# Patient Record
Sex: MALE | Race: WHITE | NOT HISPANIC OR LATINO | Employment: OTHER | URBAN - NONMETROPOLITAN AREA
[De-identification: names, ages, dates, MRNs, and addresses within clinical notes are randomized per-mention and may not be internally consistent; named-entity substitution may affect disease eponyms.]

---

## 2017-06-09 ENCOUNTER — OFFICE VISIT (OUTPATIENT)
Dept: FAMILY MEDICINE CLINIC | Facility: CLINIC | Age: 42
End: 2017-06-09

## 2017-06-09 VITALS
SYSTOLIC BLOOD PRESSURE: 120 MMHG | WEIGHT: 196 LBS | BODY MASS INDEX: 28.06 KG/M2 | HEIGHT: 70 IN | DIASTOLIC BLOOD PRESSURE: 70 MMHG

## 2017-06-09 DIAGNOSIS — L29.9 ITCH OF SKIN: ICD-10-CM

## 2017-06-09 DIAGNOSIS — R21 RASH, SKIN: Primary | ICD-10-CM

## 2017-06-09 PROBLEM — Z72.0 TOBACCO USE: Chronic | Status: ACTIVE | Noted: 2017-06-09

## 2017-06-09 PROBLEM — J45.20 MILD INTERMITTENT ASTHMA: Chronic | Status: ACTIVE | Noted: 2017-06-09

## 2017-06-09 PROBLEM — J45.20 MILD INTERMITTENT ASTHMA: Status: ACTIVE | Noted: 2017-06-09

## 2017-06-09 PROBLEM — F99 PSYCHIATRIC DISORDER: Chronic | Status: ACTIVE | Noted: 2017-06-09

## 2017-06-09 PROBLEM — Z72.0 TOBACCO USE: Status: ACTIVE | Noted: 2017-06-09

## 2017-06-09 PROBLEM — F99 PSYCHIATRIC DISORDER: Status: ACTIVE | Noted: 2017-06-09

## 2017-06-09 PROCEDURE — 99213 OFFICE O/P EST LOW 20 MIN: CPT | Performed by: FAMILY MEDICINE

## 2017-06-09 RX ORDER — VILAZODONE HYDROCHLORIDE 40 MG/1
40 TABLET ORAL DAILY
COMMUNITY

## 2017-06-09 RX ORDER — PREDNISONE 20 MG/1
TABLET ORAL
Qty: 10 TABLET | Refills: 1 | Status: SHIPPED | OUTPATIENT
Start: 2017-06-09 | End: 2017-09-05

## 2017-06-09 RX ORDER — CLOBETASOL PROPIONATE 0.46 MG/ML
SOLUTION TOPICAL 2 TIMES DAILY
Qty: 2 EACH | Refills: 1 | Status: SHIPPED | OUTPATIENT
Start: 2017-06-09 | End: 2017-12-18

## 2017-06-09 NOTE — PROGRESS NOTES
Subjective   Ankush Miller is a 42 y.o. male.     History of Present Illness resting evaluation rash.  Says his had about a month.  2 other people in the home and that it.  Went to local pharmacy store got permethrin treated all 3.  Says rash is better but still itches.  Is also wash all bed linens and underclothing.  History noted.    The following portions of the patient's history were reviewed and updated as appropriate: allergies, current medications, past family history, past medical history, past social history, past surgical history and problem list.    Review of Systems   Constitutional: Negative for activity change, appetite change, fatigue and unexpected weight change.   HENT: Negative for trouble swallowing and voice change.    Eyes: Negative for redness and visual disturbance.   Respiratory: Negative for cough and wheezing.    Cardiovascular: Negative for chest pain and palpitations.   Gastrointestinal: Negative for abdominal pain, constipation, diarrhea, nausea and vomiting.   Genitourinary: Negative for urgency.   Musculoskeletal: Negative for joint swelling.   Skin: Positive for rash.   Neurological: Negative for syncope and headaches.   Hematological: Negative for adenopathy.   Psychiatric/Behavioral: Negative for sleep disturbance.       Objective   Physical Exam   HENT:   Head: Normocephalic.   Neck: Normal range of motion.   Cardiovascular: Normal rate.    Pulmonary/Chest: Effort normal.   Abdominal: Soft.   Skin:   Shows no evidence of scabies upper and lower extremities.  Mild excoriations.  Most of them are dry.  Mainly just post scabies pruritus.   Psychiatric: He has a normal mood and affect. His speech is normal.       Assessment/Plan   Ankush was seen today for establish care.    Diagnoses and all orders for this visit:    Rash, skin  -     predniSONE (DELTASONE) 20 MG tablet; 2qdx5  -     clobetasol (TEMOVATE) 0.05 % external solution; Apply  topically 2 (Two) Times a Day. To skin x  7-10d    Itch of skin  -     predniSONE (DELTASONE) 20 MG tablet; 2qdx5  -     clobetasol (TEMOVATE) 0.05 % external solution; Apply  topically 2 (Two) Times a Day. To skin x 7-10d       Skincare cool water mild soap etc.  Counseled on pathophysiology of the pruritus.  Antihistamine of choice by mouth medications ordered.  Counseled on timeframe.  Otherwise defer back to the [facility] for his care.

## 2017-06-12 DIAGNOSIS — R21 RASH: Primary | ICD-10-CM

## 2017-06-12 RX ORDER — PERMETHRIN 50 MG/G
CREAM TOPICAL ONCE
Qty: 2 EACH | Refills: 3 | Status: SHIPPED | OUTPATIENT
Start: 2017-06-12 | End: 2017-06-13 | Stop reason: SDUPTHER

## 2017-06-13 ENCOUNTER — TELEPHONE (OUTPATIENT)
Dept: FAMILY MEDICINE CLINIC | Facility: CLINIC | Age: 42
End: 2017-06-13

## 2017-06-13 RX ORDER — PERMETHRIN 50 MG/G
CREAM TOPICAL ONCE
Qty: 2 EACH | Refills: 3 | Status: SHIPPED | OUTPATIENT
Start: 2017-06-13 | End: 2017-06-13

## 2017-06-13 NOTE — TELEPHONE ENCOUNTER
HAYDEN GONZALEZ WAS SEEN YESTERDAY..HE CALLED BACK BECAUSE THE PRESPS WERE SENT TO [facility] AND WILL COST HIM OVER $300 THERE.[facility] IS [facility]# [phone]   AND HE WILL NEED 3 TUBES OF THE PEMETHRIN  AND PLEASE SEND THE CLOBATOSOL AND PREDNISONE...ANY QUESTIONS PLEASE CALL HIM BACK ASAP-  HE IS NEEDING THIS DONE

## 2017-09-05 ENCOUNTER — OFFICE VISIT (OUTPATIENT)
Dept: FAMILY MEDICINE CLINIC | Facility: CLINIC | Age: 42
End: 2017-09-05

## 2017-09-05 VITALS
HEIGHT: 70 IN | SYSTOLIC BLOOD PRESSURE: 160 MMHG | WEIGHT: 199.3 LBS | DIASTOLIC BLOOD PRESSURE: 100 MMHG | BODY MASS INDEX: 28.53 KG/M2

## 2017-09-05 DIAGNOSIS — Z23 NEED FOR VACCINATION: Primary | ICD-10-CM

## 2017-09-05 DIAGNOSIS — R20.2 PARESTHESIA OF RIGHT ARM: ICD-10-CM

## 2017-09-05 DIAGNOSIS — M54.2 NECK PAIN: ICD-10-CM

## 2017-09-05 PROCEDURE — 99214 OFFICE O/P EST MOD 30 MIN: CPT | Performed by: FAMILY MEDICINE

## 2017-09-05 RX ORDER — CYCLOBENZAPRINE HCL 5 MG
5 TABLET ORAL 3 TIMES DAILY PRN
Qty: 60 TABLET | Refills: 1 | Status: SHIPPED | OUTPATIENT
Start: 2017-09-05 | End: 2017-10-30 | Stop reason: SDUPTHER

## 2017-09-05 NOTE — PROGRESS NOTES
Subjective   Ankush Miller is a 42 y.o. male.     History of Present Illness requesting reevaluation 2 month history of posterior neck pain burning stinging neuropathic pain posterior arm down into the fourth and fifth digits.  No history of direct trauma however.   had old injury in the past.  [facility] chiropractory as well as anti-inflammatories to no avail.  [facility]        The following portions of the patient's history were reviewed and updated as appropriate: allergies, current medications, past family history, past medical history, past social history, past surgical history and problem list.    Review of Systems   Constitutional: Negative for activity change, appetite change, fatigue and unexpected weight change.   HENT: Negative for trouble swallowing and voice change.    Eyes: Negative for redness and visual disturbance.   Respiratory: Negative for cough and wheezing.    Cardiovascular: Negative for chest pain and palpitations.   Gastrointestinal: Negative for abdominal pain, constipation, diarrhea, nausea and vomiting.   Genitourinary: Negative for urgency.   Musculoskeletal: Positive for neck pain and neck stiffness. Negative for joint swelling.   Neurological: Positive for numbness. Negative for syncope and headaches.   Hematological: Negative for adenopathy.   Psychiatric/Behavioral: Negative for sleep disturbance.       Objective   Physical Exam   Constitutional: He is oriented to person, place, and time. He appears well-developed.   HENT:   Head: Normocephalic.   Eyes: Pupils are equal, round, and reactive to light.   Neck: Normal range of motion. Neck supple. No thyromegaly present.   Cardiovascular: Normal rate.    Pulmonary/Chest: Effort normal.   Abdominal: Soft.   Musculoskeletal:   Normal range of motion shoulder pain to range of motion neck.    Palpable tenderness left strap muscle   Neurological: He is alert and oriented to person, place, and time. He has normal  reflexes.   Negative pronator drift   Psychiatric: He has a normal mood and affect. His speech is normal.       Assessment/Plan   Problems Addressed this Visit     None      Visit Diagnoses     Need for vaccination    -  Primary    Neck pain        Relevant Medications    cyclobenzaprine (FLEXERIL) 5 MG tablet    Other Relevant Orders    Ambulatory Referral to Physical Therapy Evaluate and treat    MRI Cervical Spine Without Contrast    Paresthesia of right arm        Relevant Medications    cyclobenzaprine (FLEXERIL) 5 MG tablet    Other Relevant Orders    Ambulatory Referral to Physical Therapy Evaluate and treat    MRI Cervical Spine Without Contrast         need for further study to rule out an anatomic lesion that needs intervention special given localized symptoms.  We'll start physical therapy medicine at request follow-up based on results.  Four-week recheck

## 2017-09-22 DIAGNOSIS — F99 PSYCHIATRIC DISORDER: Primary | Chronic | ICD-10-CM

## 2017-09-22 RX ORDER — LORAZEPAM 0.5 MG/1
TABLET ORAL
Qty: 2 TABLET | Refills: 0 | Status: SHIPPED | OUTPATIENT
Start: 2017-09-22 | End: 2017-12-18

## 2017-09-25 ENCOUNTER — TELEPHONE (OUTPATIENT)
Dept: FAMILY MEDICINE CLINIC | Facility: CLINIC | Age: 42
End: 2017-09-25

## 2017-09-29 ENCOUNTER — TELEPHONE (OUTPATIENT)
Dept: FAMILY MEDICINE CLINIC | Facility: CLINIC | Age: 42
End: 2017-09-29

## 2017-10-02 ENCOUNTER — HOSPITAL ENCOUNTER (OUTPATIENT)
Dept: MRI IMAGING | Facility: HOSPITAL | Age: 42
Discharge: HOME | End: 2017-10-02
Admitting: FAMILY MEDICINE

## 2017-10-02 ENCOUNTER — APPOINTMENT (OUTPATIENT)
Dept: MRI IMAGING | Facility: HOSPITAL | Age: 42
End: 2017-10-02

## 2017-10-02 DIAGNOSIS — M54.2 NECK PAIN: ICD-10-CM

## 2017-10-02 DIAGNOSIS — R20.2 PARESTHESIA OF RIGHT ARM: ICD-10-CM

## 2017-10-02 PROCEDURE — 72141 MRI NECK SPINE W/O DYE: CPT

## 2017-10-03 ENCOUNTER — APPOINTMENT (OUTPATIENT)
Dept: PHYSICAL THERAPY | Facility: HOSPITAL | Age: 42
End: 2017-10-03

## 2017-10-30 ENCOUNTER — TELEPHONE (OUTPATIENT)
Dept: ENDOCRINOLOGY | Facility: CLINIC | Age: 42
End: 2017-10-30

## 2017-10-30 DIAGNOSIS — M50.90 CERVICAL DISC DISEASE: Primary | ICD-10-CM

## 2017-10-30 DIAGNOSIS — M54.2 NECK PAIN: ICD-10-CM

## 2017-10-30 DIAGNOSIS — R20.2 PARESTHESIA OF RIGHT ARM: ICD-10-CM

## 2017-10-30 RX ORDER — CYCLOBENZAPRINE HCL 5 MG
5 TABLET ORAL 3 TIMES DAILY PRN
Qty: 60 TABLET | Refills: 2 | Status: SHIPPED | OUTPATIENT
Start: 2017-10-30 | End: 2018-01-25 | Stop reason: SDUPTHER

## 2017-11-06 ENCOUNTER — HOSPITAL ENCOUNTER (OUTPATIENT)
Dept: PHYSICAL THERAPY | Facility: HOSPITAL | Age: 42
Setting detail: THERAPIES SERIES
Discharge: HOME | End: 2017-11-06

## 2017-11-29 ENCOUNTER — APPOINTMENT (OUTPATIENT)
Dept: PHYSICAL THERAPY | Facility: HOSPITAL | Age: 42
End: 2017-11-29

## 2017-12-13 ENCOUNTER — TELEPHONE (OUTPATIENT)
Dept: FAMILY MEDICINE CLINIC | Facility: CLINIC | Age: 42
End: 2017-12-13

## 2017-12-13 DIAGNOSIS — M54.2 NECK PAIN: Primary | ICD-10-CM

## 2017-12-18 ENCOUNTER — HOSPITAL ENCOUNTER (OUTPATIENT)
Dept: PHYSICAL THERAPY | Facility: HOSPITAL | Age: 42
Setting detail: THERAPIES SERIES
Discharge: HOME | End: 2017-12-18

## 2017-12-18 ENCOUNTER — OFFICE VISIT (OUTPATIENT)
Dept: FAMILY MEDICINE CLINIC | Facility: CLINIC | Age: 42
End: 2017-12-18

## 2017-12-18 VITALS
WEIGHT: 199.4 LBS | SYSTOLIC BLOOD PRESSURE: 132 MMHG | DIASTOLIC BLOOD PRESSURE: 86 MMHG | HEIGHT: 70 IN | BODY MASS INDEX: 28.55 KG/M2

## 2017-12-18 DIAGNOSIS — M54.2 NECK PAIN, CHRONIC: Primary | ICD-10-CM

## 2017-12-18 DIAGNOSIS — G89.29 NECK PAIN, CHRONIC: Primary | ICD-10-CM

## 2017-12-18 DIAGNOSIS — Z86.39 HISTORY OF HYPOTESTOSTERONEMIA: ICD-10-CM

## 2017-12-18 DIAGNOSIS — M54.2 NECK PAIN: Primary | ICD-10-CM

## 2017-12-18 DIAGNOSIS — Z72.0 TOBACCO USE: ICD-10-CM

## 2017-12-18 PROCEDURE — 97162 PT EVAL MOD COMPLEX 30 MIN: CPT | Performed by: PHYSICAL THERAPIST

## 2017-12-18 PROCEDURE — 99213 OFFICE O/P EST LOW 20 MIN: CPT | Performed by: FAMILY MEDICINE

## 2017-12-18 PROCEDURE — 97140 MANUAL THERAPY 1/> REGIONS: CPT | Performed by: PHYSICAL THERAPIST

## 2017-12-18 NOTE — THERAPY EVALUATION
Outpatient Physical Therapy Ortho Initial Evaluation  [facility]     Patient Name: Ankush Miller  : 1975  MRN: [id number]  Today's Date: 2017      Visit Date: 2017  Attendance:  (12 approved)  Subjective Improvement: n/a  Next MD Appt: 17  Recert Date: 17    Therapy Diagnosis: Cervical dysfunction         Past Medical History:   Diagnosis Date   • Allergic rhinitis    • Anal pain     Anal pain - anal fissure      • Constipation    • Dyspnea    • Kidney stone    • Migraine    • Non-IgE mediated allergic asthma     IgE-mediated allergic asthma      • PTSD (post-traumatic stress disorder)    • Tobacco dependence syndrome         Past Surgical History:   Procedure Laterality Date   • ANAL FISSURECTOMY/FISTULECTOMY  2013    Anal fissurectomy with VY-angioplasty and lateral internal sphincterotomy.    • CHOLECYSTECTOMY     • ENDOSCOPY AND COLONOSCOPY  2013     1 polyp in sigmoid colon; removed by snare catuery polypectomy. Internal & external hemorrhoids found.    • INJECTION OF MEDICATION  2013    Depo Medrol (Methylprednisone) (7)         • INJECTION OF MEDICATION  2013    Phenergan (1)            Visit Dx:     ICD-10-CM ICD-9-CM   1. Neck pain M54.2 723.1             Patient History       17 0900          History    Chief Complaint Pain;Numbness   neck pain that radiates L UE  -SS      Type of Pain Neck pain  -SS      Date Current Problem(s) Began --   5 months  -SS      Brief Description of Current Complaint Patient presents c/o pain that starts in the lower cervical spine that radiates across the upper trap region and down the left upper extremity. He is experiencing intermittent numbness in the left RF & SF. He had some pain and numbness chronically that worsened after a fall approximately 5 months ago. Slipped in kitchen floor. Landed on his back, but he had his neck flexed to protect it. This jerked on his neck. He went to a chiropractor for 3  "visits which made him feel worse.  male with children.  -SS      Patient/Caregiver Goals Relieve pain;Improve strength  -SS      Current Tobacco Use smoker  -SS      Smoking Status 6 cigarettes/day  -SS      Patient's Rating of General Health Very good  -SS      Hand Dominance right-handed  -SS      Occupation/sports/leisure activities Disabled. Hobbies: build software  -      What clinical tests have you had for this problem? MRI  -SS      Results of Clinical Tests results in EMR  -SS      Pain     Pain Location Neck;Arm  -SS      Pain at Present 2  -SS      Pain at Best 2   over past 1 month  -SS      Pain at Worst 4   over past 1 month  -SS      Pain Frequency Constant/continuous  -SS      Pain Description Burning;Radiating   occasional numbness & tingling  -SS      What Performance Factors Make the Current Problem(s) WORSE? keeping arm out in front of him, sleeping in a funny angle, \"sometimes trying to go to bed at all\"  -SS      What Performance Factors Make the Current Problem(s) BETTER? putting hand behind back  -SS      Is your sleep disturbed? Yes  -SS      Is medication used to assist with sleep? No  -SS      Difficulties at work? n/a  -SS      Difficulties with ADL's? none  -SS      Difficulties with recreational activities? occasional sitting at desk  -SS      Fall Risk Assessment    Any falls in the past year: Yes  -SS      Number of falls reported in the last 12 months 1  -SS      Factors that contributed to the fall: Slippery surface  -SS      Does patient have a fear of falling No  -SS      Daily Activities    Primary Language English  -      Safety    Are you being hurt, hit, or frightened by anyone at home or in your life? No  -SS      Are you being neglected by a caregiver No  -SS        User Key  (r) = Recorded By, (t) = Taken By, (c) = Cosigned By    Initials Name Provider Type    SS Hermilo Johns, PT DPT Physical Therapist                PT Ortho       12/18/17 0900    " Subjective Comments    Subjective Comments see Therapy Patient History  -SS    Precautions and Contraindications    Precautions/Limitations no known precautions/limitations  -SS    Precautions none  -SS    Contraindications none  -SS    Subjective Pain    Able to rate subjective pain? yes  -SS    Pre-Treatment Pain Level 2  -SS    Post-Treatment Pain Level 2  -SS    Subjective Pain Comment patient declines modalities  -SS    Posture/Observations    Alignment Options Forward head;Cervical lordosis;Thoracic kyphosis;Rounded shoulders;Scapular elevation;Lumbar lordosis  -SS    Forward Head Mild  -SS    Cervical Lordosis Decreased  -SS    Thoracic Kyphosis Normal  -SS    Rounded Shoulders Left:  -SS    Scapular Elevation Left:  -SS    Lumbar lordosis Normal  -SS    DTR- Upper Quarter Clearing    Biceps (C5/6) Bilateral:;1- Minimal response  -SS    Brachioradialis (C6) Bilateral:;1- Minimal response  -SS    Triceps (C7) Bilateral:;1- Minimal response  -SS    Myotomal Screen- Upper Quarter Clearing    Shoulder flexion (C5) Right:;5 (Normal);Left:;4 (Good)   L cervical pain  -SS    Elbow flexion/wrist extension (C6) Bilateral:;5 (Normal)  -SS    Elbow extension/wrist flexion (C7) Bilateral:;5 (Normal)  -SS    Finger flexion/ (C8) Bilateral:;5 (Normal)  -SS    Finger abduction (T1) Bilateral:;5 (Normal)  -SS     --   R 110#, L 105#  -SS    Cervical Palpation    Cervical Palpation- Location? Occiput;Suboccipital;SCM;Cervical facets;Spinous process;Levator scapula;Upper traps  -SS    Occiput --   non-tender  -SS    Suboccipital --   non-tender  -SS    SCM --   non-tender  -SS    Cervical Facets Left:;Tender   lower cervical spine R SB & rotated  -SS    Spinous Process Left:;Tender   lower cervical spine R SB & rotated  -SS    Levator Scapula Left:;Tender  -SS    Upper Traps Left:;Tender;Guarded/taut;Trigger point  -SS    Upper Level Neural Tension Tests    Median Neural Tension Test Left:;Negative  -SS    Neck     Flexion AROM Deficit 42; pulling posterior-L lower cervical spine/CT junction  -SS    Extension AROM Deficit 40  -SS    Lt Lat Flexion AROM Deficit 30  -SS    Rt Lateral Flexion AROM Deficit 33; pulling posterior-L lower cervical spine  -SS    Lt Rotation AROM Deficit 60; pulling posterior-L lower cervical spine  -SS    Rt Rotation AROM Deficit 71; pulling posterior-L lower cervical spine  -SS    General UE Assessment    ROM Detail B shoulder AROM grossly WNLs. Patient noted pain in L SS/UT region with IR  -SS      User Key  (r) = Recorded By, (t) = Taken By, (c) = Cosigned By    Initials Name Provider Type     Hermilo Johns, PT DPT Physical Therapist                      Therapy Education  Education Details: levator scapula stretch, UT stretch  Given: HEP  Program: New  How Provided: Verbal, Demonstration  Provided to: Patient  Level of Understanding: Verbalized, Demonstrated           PT OP Goals       12/18/17 0900       PT Short Term Goals    STG Date to Achieve 01/08/18  -SS     STG 1 Note a >/= 50% improvement  -     STG 2 NDI score </= 10  -     STG 3 L shoulder flexion MMT 5/5  -SS     Long Term Goals    LTG Date to Achieve 01/29/18  -SS     LTG 1 Independent with HEP  -     LTG 2 CROM WNLs without pain  -     LTG 3 Minimal to no pain with activity  -     Time Calculation    PT Goal Re-Cert Due Date 01/08/18  -       User Key  (r) = Recorded By, (t) = Taken By, (c) = Cosigned By    Initials Name Provider Type     Hermilo Johns, PT DPT Physical Therapist                PT Assessment/Plan       12/18/17 1000       PT Assessment    Functional Limitations Limitations in functional capacity and performance  -     Impairments Range of motion;Pain;Joint mobility  -     Assessment Comments Patient's alignment was corrected with ME this date. Decreased trigger point L UT once alignment was corrected.  -     Rehab Potential Good  -SS     Patient/caregiver participated in  establishment of treatment plan and goals Yes  -SS     Patient would benefit from skilled therapy intervention Yes  -SS     PT Plan    PT Frequency 2x/week  -SS     Predicted Duration of Therapy Intervention (days/wks) 4-6 weeks  -SS     Planned CPT's? PT EVAL MOD COMPLEXITY: 12098;PT THER PROC EA 15 MIN: 15843;PT THER ACT EA 15 MIN: 91747;PT MANUAL THERAPY EA 15 MIN: 00947;PT ELECTRICAL STIM UNATTEND: ;PT HOT OR COLD PACK TREAT MCARE;PT ULTRASOUND EA 15 MIN: 73030;PT THER SUPP EA 15 MIN  -SS     PT Plan Comments Cervical ROM, stretching, strengthening; US/estim combo to lower cervicals and UT; manual therapy/MFR/joint mobilization; IFC estim with heat/ice as needed for pain. Work to transition to self-management as quickly as able.  -SS       User Key  (r) = Recorded By, (t) = Taken By, (c) = Cosigned By    Initials Name Provider Type    KASSIDY Johns, WALKER DPT Physical Therapist                  Exercises       12/18/17 0900          Subjective Comments    Subjective Comments see Therapy Patient History  -SS      Subjective Pain    Able to rate subjective pain? yes  -SS      Pre-Treatment Pain Level 2  -SS      Post-Treatment Pain Level 2  -SS      Subjective Pain Comment patient declines modalities  -SS      Exercise 1    Exercise Name 1 UT stretch - L  -SS      Cueing 1 Verbal;Demo  -SS      Sets 1 3  -SS      Time (Seconds) 1 30 sec hold  -SS      Exercise 2    Exercise Name 2 Lev scap stretch - L  -SS      Cueing 2 Verbal;Demo  -SS      Sets 2 3  -SS      Time (Seconds) 2 30 sec hold  -SS        User Key  (r) = Recorded By, (t) = Taken By, (c) = Cosigned By    Initials Name Provider Type     Hermilo Johns, PT DPT Physical Therapist           Manual Rx (last 36 hours)      Manual Treatments       12/18/17 0900          Manual Rx 1    Manual Rx 1 Location cervical spine  -SS      Manual Rx 1 Type ME  -SS      Manual Rx 2    Manual Rx 2 Location Thoracic spine  -SS      Manual Rx 2 Type  joint mobilization  -SS      Manual Rx 2 Grade 4  -SS        User Key  (r) = Recorded By, (t) = Taken By, (c) = Cosigned By    Initials Name Provider Type    SS Hermilo Johns, PT DPT Physical Therapist                      Outcome Measure Options: Neck Disability Index (NDI)  Neck Disability Index  Section 1 - Pain Intensity: The pain is very mild at the moment.  Section 2 - Personal Care: I can look after myself normally without causing extra pain.  Section 3 - Lifting: Pain prevents me from lifting heavy weights off the floor but I can manage if items are conveniently positioned, ie. on a table.  Section 4 - Work: I can do most of my usual work, but no more  Section 6 - Concentration: I have a fair degree of difficulty concentrating.  Section 7 - Sleeping: My sleep is moderately disturbed for up to 2-3 hours.  Section 8 - Driving: I can't drive as long as I want because of moderate neck pain.  Section 9 - Reading: I can read as much as I want with moderate neck pain.  Neck Disability Index Score: 15      Time Calculation:   Start Time: 0917  Stop Time: 0951  Time Calculation (min): 34 min  Total Timed Code Minutes- PT: 10 minute(s)     Therapy Charges for Today     Code Description Service Date Service Provider Modifiers Qty    75711839979 HC PT MANUAL THERAPY EA 15 MIN 12/18/2017 Hermilo Johns, PT DPT GP 1    12125412398 HC PT EVAL MOD COMPLEXITY 2 12/18/2017 Hermilo Johns, PT DPT GP 1                   Hermilo Johns, PT, DPT, CHT  12/18/2017

## 2017-12-18 NOTE — PROGRESS NOTES
Subjective   Ankush Miller is a 42 y.o. male.     History of Present Illness  patient states was starting primary care [place] [facility] [place] [facility].  Apparently gets medication for chronic pain syndrome and hypotension testosteronemia.  Schistocytes injections or 2 weeks medicines.    Gabapentin.  Have counseled the need to [facility] in regards to dosing drug levels etc. before proceed any further.  He did start his therapy for neck pain today.  History noted.    The following portions of the patient's history were reviewed and updated as appropriate: allergies, current medications, past family history, past medical history, past social history, past surgical history and problem list.    Review of Systems   Constitutional: Negative for activity change, appetite change, fatigue and unexpected weight change.   HENT: Negative for trouble swallowing and voice change.    Eyes: Negative for redness and visual disturbance.   Respiratory: Negative for cough and wheezing.    Cardiovascular: Negative for chest pain and palpitations.   Gastrointestinal: Negative for abdominal pain, constipation, diarrhea, nausea and vomiting.   Genitourinary: Negative for urgency.   Musculoskeletal: Positive for neck pain. Negative for joint swelling.   Neurological: Negative for syncope and headaches.   Hematological: Negative for adenopathy.   Psychiatric/Behavioral: Negative for sleep disturbance.       Objective   Physical Exam   Constitutional: He appears well-developed.   HENT:   Head: Normocephalic.   Eyes: Pupils are equal, round, and reactive to light.   Neck: Normal range of motion.   Cardiovascular: Normal rate.    Pulmonary/Chest: Effort normal.   Abdominal: Soft.   Psychiatric: He has a normal mood and affect. His behavior is normal. Thought content normal.       Assessment/Plan   Ankush was seen today for follow-up.    Diagnoses and all orders for this visit:    Neck pain, chronic    Tobacco  use    History of hypotestosteronemia       On the above will follow-up based on results and records

## 2017-12-19 ENCOUNTER — HOSPITAL ENCOUNTER (OUTPATIENT)
Dept: PHYSICAL THERAPY | Facility: HOSPITAL | Age: 42
Setting detail: THERAPIES SERIES
Discharge: HOME | End: 2017-12-19

## 2017-12-19 DIAGNOSIS — M54.2 NECK PAIN: Primary | ICD-10-CM

## 2017-12-19 PROCEDURE — 97140 MANUAL THERAPY 1/> REGIONS: CPT

## 2017-12-19 PROCEDURE — 97035 APP MDLTY 1+ULTRASOUND EA 15: CPT

## 2017-12-19 PROCEDURE — 97110 THERAPEUTIC EXERCISES: CPT

## 2017-12-19 NOTE — THERAPY TREATMENT NOTE
Outpatient Physical Therapy Ortho Treatment Note  [facility]     Patient Name: Ankush Miller  : 1975  MRN: [id number]  Today's Date: 2017      Visit Date: 2017     Subjective Improvement 0  Visits 2/2  Visits 12 approved  RTMD PRN  Recert Date 2017    Cervical Dysfunction      Visit Dx:    ICD-10-CM ICD-9-CM   1. Neck pain M54.2 723.1       Patient Active Problem List   Diagnosis   • Tobacco use   • Mild intermittent asthma   • Psychiatric disorder   • History of hypotestosteronemia        Past Medical History:   Diagnosis Date   • Allergic rhinitis    • Anal pain     Anal pain - anal fissure      • Constipation    • Dyspnea    • Kidney stone    • Migraine    • Non-IgE mediated allergic asthma     IgE-mediated allergic asthma      • PTSD (post-traumatic stress disorder)    • Tobacco dependence syndrome         Past Surgical History:   Procedure Laterality Date   • ANAL FISSURECTOMY/FISTULECTOMY  2013    Anal fissurectomy with VY-angioplasty and lateral internal sphincterotomy.    • CHOLECYSTECTOMY     • ENDOSCOPY AND COLONOSCOPY  2013     1 polyp in sigmoid colon; removed by snare catuery polypectomy. Internal & external hemorrhoids found.    • INJECTION OF MEDICATION  2013    Depo Medrol (Methylprednisone) (7)         • INJECTION OF MEDICATION  2013    Phenergan (1)                  PT Ortho       17 0900    Subjective Comments    Subjective Comments see Therapy Patient History  -SS    Precautions and Contraindications    Precautions/Limitations no known precautions/limitations  -SS    Precautions none  -SS    Contraindications none  -SS    Subjective Pain    Able to rate subjective pain? yes  -SS    Pre-Treatment Pain Level 2  -SS    Post-Treatment Pain Level 2  -SS    Subjective Pain Comment patient declines modalities  -SS    Posture/Observations    Alignment Options Forward head;Cervical lordosis;Thoracic kyphosis;Rounded shoulders;Scapular  elevation;Lumbar lordosis  -SS    Forward Head Mild  -SS    Cervical Lordosis Decreased  -SS    Thoracic Kyphosis Normal  -SS    Rounded Shoulders Left:  -SS    Scapular Elevation Left:  -SS    Lumbar lordosis Normal  -SS    DTR- Upper Quarter Clearing    Biceps (C5/6) Bilateral:;1- Minimal response  -SS    Brachioradialis (C6) Bilateral:;1- Minimal response  -SS    Triceps (C7) Bilateral:;1- Minimal response  -SS    Myotomal Screen- Upper Quarter Clearing    Shoulder flexion (C5) Right:;5 (Normal);Left:;4 (Good)   L cervical pain  -SS    Elbow flexion/wrist extension (C6) Bilateral:;5 (Normal)  -SS    Elbow extension/wrist flexion (C7) Bilateral:;5 (Normal)  -SS    Finger flexion/ (C8) Bilateral:;5 (Normal)  -SS    Finger abduction (T1) Bilateral:;5 (Normal)  -SS     --   R 110#, L 105#  -SS    Cervical Palpation    Cervical Palpation- Location? Occiput;Suboccipital;SCM;Cervical facets;Spinous process;Levator scapula;Upper traps  -SS    Occiput --   non-tender  -SS    Suboccipital --   non-tender  -SS    SCM --   non-tender  -SS    Cervical Facets Left:;Tender   lower cervical spine R SB & rotated  -SS    Spinous Process Left:;Tender   lower cervical spine R SB & rotated  -SS    Levator Scapula Left:;Tender  -SS    Upper Traps Left:;Tender;Guarded/taut;Trigger point  -SS    Upper Level Neural Tension Tests    Median Neural Tension Test Left:;Negative  -SS    Neck    Flexion AROM Deficit 42; pulling posterior-L lower cervical spine/CT junction  -SS    Extension AROM Deficit 40  -SS    Lt Lat Flexion AROM Deficit 30  -SS    Rt Lateral Flexion AROM Deficit 33; pulling posterior-L lower cervical spine  -SS    Lt Rotation AROM Deficit 60; pulling posterior-L lower cervical spine  -SS    Rt Rotation AROM Deficit 71; pulling posterior-L lower cervical spine  -SS    General UE Assessment    ROM Detail B shoulder AROM grossly WNLs. Patient noted pain in L SS/UT region with IR  -SS      User Key  (r) = Recorded By,  (t) = Taken By, (c) = Cosigned By    Initials Name Provider Type     Hermilo Johns, PT DPT Physical Therapist                            PT Assessment/Plan       12/19/17 2633       PT Assessment    Assessment Comments ME corrected rotation.  -CP     PT Plan    PT Frequency 2x/week  -CP     Predicted Duration of Therapy Intervention (days/wks) 4 weeks  -CP     PT Plan Comments cont with POC. Monitor cervical rotationn  -CP       User Key  (r) = Recorded By, (t) = Taken By, (c) = Cosigned By    Initials Name Provider Type    JEFFREY Moe PTA Physical Therapy Assistant                Modalities       12/19/17 1100          Subjective Pain    Post-Treatment Pain Level 1  -CP      Ultrasound 97256    Location left UT and cervical area  -CP      Rx Minutes 8  -CP      Combo RX Minutes 79629 8  -CP      Duty Cycle 100  -CP      Frequency 1.0 MHz  -CP      Intensity - Wts/cm 1.5  -CP        User Key  (r) = Recorded By, (t) = Taken By, (c) = Cosigned By    Initials Name Provider Type    JEFFREY Moe PTA Physical Therapy Assistant                Exercises       12/19/17 1100          Subjective Comments    Subjective Comments Patient states that he felt better aft er the last PT visits  -CP      Subjective Pain    Able to rate subjective pain? yes  -CP      Pre-Treatment Pain Level 3  -CP      Post-Treatment Pain Level 1  -CP      Aquatics    Aquatics performed? No  -CP      Exercise 1    Exercise Name 1 Bilateral UT Stretch  -CP      Sets 1 3  -CP      Time (Seconds) 1 30  -CP      Exercise 2    Exercise Name 2 Bilateral LT stretch  -CP      Sets 2 3  -CP      Time (Seconds) 2 30  -CP      Exercise 3    Exercise Name 3 AROM cervical rotation  -CP      Sets 3 2  -CP      Reps 3 10  -CP      Exercise 4    Exercise Name 4 review HEP  -CP        User Key  (r) = Recorded By, (t) = Taken By, (c) = Cosigned By    Initials Name Provider Type    JEFFREY Moe PTA Physical Therapy Assistant                         Manual Rx (last 36 hours)      Manual Treatments       12/19/17 1300 12/18/17 0900       Manual Rx 1    Manual Rx 1 Location cervical spine  -CP cervical spine  -SS     Manual Rx 1 Type distraction  -CP ME  -SS     Manual Rx 1 Duration 5  -CP      Manual Rx 2    Manual Rx 2 Location Cervical spine  -CP Thoracic spine  -SS     Manual Rx 2 Type ME to correct left rotation  -CP joint mobilization  -SS     Manual Rx 2 Grade  4  -SS     Manual Rx 2 Duration 3  -CP        User Key  (r) = Recorded By, (t) = Taken By, (c) = Cosigned By    Initials Name Provider Type    SS Hermilo Johns, PT DPT Physical Therapist    CP Wen Moe, PTA Physical Therapy Assistant                PT OP Goals       12/19/17 1300       PT Short Term Goals    STG Date to Achieve 01/08/18  -CP     STG 1 Note a >/= 50% improvement  -CP     STG 1 Progress Not Met  -CP     STG 2 NDI score </= 10  -CP     STG 2 Progress Not Met  -CP     STG 3 L shoulder flexion MMT 5/5  -CP     STG 3 Progress Not Met  -CP     Long Term Goals    LTG Date to Achieve 01/29/18  -CP     LTG 1 Independent with HEP  -CP     LTG 1 Progress Progressing  -CP     LTG 2 CROM WNLs without pain  -CP     LTG 2 Progress Not Met  -CP     LTG 3 Minimal to no pain with activity  -CP     LTG 3 Progress Not Met  -CP     Time Calculation    PT Goal Re-Cert Due Date 01/08/18  -CP       User Key  (r) = Recorded By, (t) = Taken By, (c) = Cosigned By    Initials Name Provider Type    CP Wen Moe, CAR Physical Therapy Assistant          Therapy Education  Education Details: AROM cervical rotation  Given: HEP  Program: New  How Provided: Verbal, Demonstration  Provided to: Patient  Level of Understanding: Verbalized, Demonstrated              Time Calculation:   Start Time: 1105  Stop Time: 1145  Time Calculation (min): 40 min  Total Timed Code Minutes- PT: 40 minute(s)    Therapy Charges for Today     Code Description Service Date Service Provider  Modifiers Qty    06880348577 HC PT ULTRASOUND EA 15 MIN 12/19/2017 Wen Moe, PTA GP 1    21213196959 HC PT MANUAL THERAPY EA 15 MIN 12/19/2017 Wen Moe, PTA GP 1    15472942489 HC PT THER PROC EA 15 MIN 12/19/2017 Wen Moe, PTA GP 1                    Wen Moe, PTA  12/19/2017

## 2018-01-03 ENCOUNTER — TELEPHONE (OUTPATIENT)
Dept: FAMILY MEDICINE CLINIC | Facility: CLINIC | Age: 43
End: 2018-01-03

## 2018-01-09 ENCOUNTER — OFFICE VISIT (OUTPATIENT)
Dept: FAMILY MEDICINE CLINIC | Facility: CLINIC | Age: 43
End: 2018-01-09

## 2018-01-09 ENCOUNTER — HOSPITAL ENCOUNTER (OUTPATIENT)
Dept: PHYSICAL THERAPY | Facility: HOSPITAL | Age: 43
Setting detail: THERAPIES SERIES
Discharge: HOME | End: 2018-01-09

## 2018-01-09 ENCOUNTER — APPOINTMENT (OUTPATIENT)
Dept: LAB | Facility: HOSPITAL | Age: 43
End: 2018-01-09

## 2018-01-09 VITALS
DIASTOLIC BLOOD PRESSURE: 90 MMHG | WEIGHT: 197 LBS | HEIGHT: 70 IN | SYSTOLIC BLOOD PRESSURE: 128 MMHG | BODY MASS INDEX: 28.2 KG/M2

## 2018-01-09 DIAGNOSIS — G89.29 NECK PAIN, CHRONIC: ICD-10-CM

## 2018-01-09 DIAGNOSIS — M54.2 NECK PAIN, CHRONIC: ICD-10-CM

## 2018-01-09 DIAGNOSIS — Z86.39 HISTORY OF HYPOTESTOSTERONEMIA: Primary | Chronic | ICD-10-CM

## 2018-01-09 DIAGNOSIS — L85.3 DRY SKIN: ICD-10-CM

## 2018-01-09 DIAGNOSIS — Z72.0 TOBACCO USE: ICD-10-CM

## 2018-01-09 DIAGNOSIS — L60.3 NAIL DYSTROPHY: ICD-10-CM

## 2018-01-09 DIAGNOSIS — M54.2 NECK PAIN: Primary | ICD-10-CM

## 2018-01-09 DIAGNOSIS — R73.9 HYPERGLYCEMIA: ICD-10-CM

## 2018-01-09 LAB
ALBUMIN SERPL-MCNC: 4.9 G/DL (ref 3.4–4.8)
ALBUMIN UR-MCNC: 1.9 MG/L
ALBUMIN/GLOB SERPL: 1.4 G/DL (ref 1.1–1.8)
ALP SERPL-CCNC: 68 U/L (ref 38–126)
ALT SERPL W P-5'-P-CCNC: 80 U/L (ref 21–72)
ANION GAP SERPL CALCULATED.3IONS-SCNC: 13 MMOL/L (ref 5–15)
AST SERPL-CCNC: 53 U/L (ref 17–59)
BILIRUB SERPL-MCNC: 0.6 MG/DL (ref 0.2–1.3)
BUN BLD-MCNC: 16 MG/DL (ref 7–21)
BUN/CREAT SERPL: 14.5 (ref 7–25)
CALCIUM SPEC-SCNC: 10.3 MG/DL (ref 8.4–10.2)
CHLORIDE SERPL-SCNC: 99 MMOL/L (ref 95–110)
CHOLEST SERPL-MCNC: 244 MG/DL (ref 0–199)
CO2 SERPL-SCNC: 26 MMOL/L (ref 22–31)
CREAT BLD-MCNC: 1.1 MG/DL (ref 0.7–1.3)
DEPRECATED RDW RBC AUTO: 45.1 FL (ref 35.1–43.9)
ERYTHROCYTE [DISTWIDTH] IN BLOOD BY AUTOMATED COUNT: 13.4 % (ref 11.5–14.5)
GFR SERPL CREATININE-BSD FRML MDRD: 73 ML/MIN/1.73 (ref 63–147)
GLOBULIN UR ELPH-MCNC: 3.4 GM/DL (ref 2.3–3.5)
GLUCOSE BLD-MCNC: 168 MG/DL (ref 60–100)
HBA1C MFR BLD: 6.7 % (ref 4–5.6)
HCT VFR BLD AUTO: 44.8 % (ref 39–49)
HDLC SERPL-MCNC: 43 MG/DL (ref 60–200)
HGB BLD-MCNC: 15.1 G/DL (ref 13.7–17.3)
LDLC SERPL CALC-MCNC: ABNORMAL MG/DL (ref 0–129)
LDLC/HDLC SERPL: ABNORMAL {RATIO} (ref 0–3.55)
MAGNESIUM SERPL-MCNC: 2.3 MG/DL (ref 1.6–2.3)
MCH RBC QN AUTO: 31.2 PG (ref 26.5–34)
MCHC RBC AUTO-ENTMCNC: 33.7 G/DL (ref 31.5–36.3)
MCV RBC AUTO: 92.6 FL (ref 80–98)
PLATELET # BLD AUTO: 351 10*3/MM3 (ref 150–450)
PMV BLD AUTO: 10.4 FL (ref 8–12)
POTASSIUM BLD-SCNC: 4.5 MMOL/L (ref 3.5–5.1)
PROT SERPL-MCNC: 8.3 G/DL (ref 6.3–8.6)
RBC # BLD AUTO: 4.84 10*6/MM3 (ref 4.37–5.74)
SODIUM BLD-SCNC: 138 MMOL/L (ref 137–145)
T4 FREE SERPL-MCNC: 0.8 NG/DL (ref 0.78–2.19)
TRIGL SERPL-MCNC: 968 MG/DL (ref 20–199)
TSH SERPL DL<=0.05 MIU/L-ACNC: 0.51 MIU/ML (ref 0.46–4.68)
VIT B12 BLD-MCNC: 304 PG/ML (ref 239–931)
VLDLC SERPL-MCNC: ABNORMAL MG/DL
WBC NRBC COR # BLD: 9.17 10*3/MM3 (ref 3.2–9.8)

## 2018-01-09 PROCEDURE — 84403 ASSAY OF TOTAL TESTOSTERONE: CPT | Performed by: FAMILY MEDICINE

## 2018-01-09 PROCEDURE — 99214 OFFICE O/P EST MOD 30 MIN: CPT | Performed by: FAMILY MEDICINE

## 2018-01-09 PROCEDURE — 82043 UR ALBUMIN QUANTITATIVE: CPT | Performed by: FAMILY MEDICINE

## 2018-01-09 PROCEDURE — 82607 VITAMIN B-12: CPT | Performed by: FAMILY MEDICINE

## 2018-01-09 PROCEDURE — 80053 COMPREHEN METABOLIC PANEL: CPT | Performed by: FAMILY MEDICINE

## 2018-01-09 PROCEDURE — 83036 HEMOGLOBIN GLYCOSYLATED A1C: CPT | Performed by: FAMILY MEDICINE

## 2018-01-09 PROCEDURE — 97035 APP MDLTY 1+ULTRASOUND EA 15: CPT

## 2018-01-09 PROCEDURE — 85027 COMPLETE CBC AUTOMATED: CPT | Performed by: FAMILY MEDICINE

## 2018-01-09 PROCEDURE — 97110 THERAPEUTIC EXERCISES: CPT

## 2018-01-09 PROCEDURE — 84439 ASSAY OF FREE THYROXINE: CPT | Performed by: FAMILY MEDICINE

## 2018-01-09 PROCEDURE — 80061 LIPID PANEL: CPT | Performed by: FAMILY MEDICINE

## 2018-01-09 PROCEDURE — 84443 ASSAY THYROID STIM HORMONE: CPT | Performed by: FAMILY MEDICINE

## 2018-01-09 PROCEDURE — 36415 COLL VENOUS BLD VENIPUNCTURE: CPT | Performed by: FAMILY MEDICINE

## 2018-01-09 PROCEDURE — 83735 ASSAY OF MAGNESIUM: CPT | Performed by: FAMILY MEDICINE

## 2018-01-09 PROCEDURE — G0283 ELEC STIM OTHER THAN WOUND: HCPCS

## 2018-01-09 PROCEDURE — 84402 ASSAY OF FREE TESTOSTERONE: CPT | Performed by: FAMILY MEDICINE

## 2018-01-09 RX ORDER — GABAPENTIN 300 MG/1
900 CAPSULE ORAL 3 TIMES DAILY
Qty: 270 CAPSULE | Refills: 2 | Status: SHIPPED | OUTPATIENT
Start: 2018-01-09 | End: 2018-02-20 | Stop reason: SDUPTHER

## 2018-01-09 NOTE — PROGRESS NOTES
Subjective   Ankush Miller is a 42 y.o. male.     History of Present Illness   multiple issues does not have record for testosterone level today.  Also having nail loss dry scans hands and feet.  Was told one time may have psoriatic arthritis may also be related to his chronic neck pain.  Needs refill on gabapentin.  Have counseled the need for a stepwise care.  He does need a referral to rheumatology very well may have psoriatic arthritis also a baseline labs.    The following portions of the patient's history were reviewed and updated as appropriate: allergies, current medications, past family history, past medical history, past social history, past surgical history and problem list.    Review of Systems   Constitutional: Negative for activity change, appetite change, fatigue and unexpected weight change.   HENT: Negative for trouble swallowing and voice change.    Eyes: Negative for redness and visual disturbance.   Respiratory: Negative for cough and wheezing.    Cardiovascular: Negative for chest pain and palpitations.   Gastrointestinal: Negative for abdominal pain, constipation, diarrhea, nausea and vomiting.   Genitourinary: Negative for urgency.   Musculoskeletal: Positive for neck pain. Negative for joint swelling.   Skin: Positive for color change.   Neurological: Positive for numbness. Negative for syncope and headaches.   Hematological: Negative for adenopathy.   Psychiatric/Behavioral: Negative for sleep disturbance.       Objective   Physical Exam   HENT:   Head: Normocephalic.   Eyes: Pupils are equal, round, and reactive to light.   Neck: Neck supple.   Cardiovascular: Normal rate.    Pulmonary/Chest: Effort normal.   Abdominal: Soft.   Skin:    dystrophic nails all.  Dryness both palm are plantar skin.     Psychiatric: His mood appears anxious.       Assessment/Plan   Problems Addressed this Visit        Nervous and Auditory    Neck pain, chronic (Chronic)    Relevant Medications    gabapentin  (NEURONTIN) 300 MG capsule    Other Relevant Orders    Ambulatory Referral to Rheumatology (Completed)       Other    Tobacco use (Chronic)    History of hypotestosteronemia - Primary (Chronic)    Relevant Orders    Testosterone, Free, Total    Lipid Panel With LDL / HDL Ratio      Other Visit Diagnoses     Hyperglycemia        Relevant Orders    Comprehensive Metabolic Panel    Hemoglobin A1c    Magnesium    MicroAlbumin, Urine, Random - Urine, Clean Catch    Nail dystrophy        Relevant Orders    Ambulatory Referral to Rheumatology (Completed)    Dry skin        Relevant Orders    T4, Free    TSH    Vitamin B12    CBC (No Diff)        Review of gait refill gabapentin labs as above follow-up based on studies.

## 2018-01-09 NOTE — THERAPY TREATMENT NOTE
Outpatient Physical Therapy Ortho Treatment Note  [facility]     Patient Name: Ankush Miller  : 1975  MRN: [id number]  Today's Date: 2018      Visit Date: 2018     Subjective Improvement 0  Visits 3/3  Visits approved 12 from 2017 to 2018  RTMD PRN  Recert 2018    Cspine dysfunction    Visit Dx:    ICD-10-CM ICD-9-CM   1. Neck pain M54.2 723.1       Patient Active Problem List   Diagnosis   • Tobacco use   • Mild intermittent asthma   • Psychiatric disorder   • History of hypotestosteronemia   • Neck pain, chronic        Past Medical History:   Diagnosis Date   • Allergic rhinitis    • Anal pain     Anal pain - anal fissure      • Constipation    • Dyspnea    • Kidney stone    • Migraine    • Non-IgE mediated allergic asthma     IgE-mediated allergic asthma      • PTSD (post-traumatic stress disorder)    • Tobacco dependence syndrome         Past Surgical History:   Procedure Laterality Date   • ANAL FISSURECTOMY/FISTULECTOMY  2013    Anal fissurectomy with VY-angioplasty and lateral internal sphincterotomy.    • CHOLECYSTECTOMY     • ENDOSCOPY AND COLONOSCOPY  2013     1 polyp in sigmoid colon; removed by snare catuery polypectomy. Internal & external hemorrhoids found.    • INJECTION OF MEDICATION  2013    Depo Medrol (Methylprednisone) (7)         • INJECTION OF MEDICATION  2013    Phenergan (1)                                  PT Assessment/Plan       18 1255       PT Assessment    Assessment Comments Patient appeared to have a diffilcuti time remaining still for the US.  while patient was performing cpsine stretching, he was walking around the treatment room  -CP     PT Plan    PT Frequency 2x/week  -CP     Predicted Duration of Therapy Intervention (days/wks) 2 weeks  -CP     PT Plan Comments Recheck next visit  -CP       User Key  (r) = Recorded By, (t) = Taken By, (c) = Cosigned By    Initials Name Provider Type    CP Wen Moe  PTA Physical Therapy Assistant                Modalities       01/09/18 1100          Moist Heat    MH Applied Yes  -CP      Location cspine  -CP      Rx Minutes 10 mins  -CP      MH Prior to Rx Yes  -CP      MH S/P Rx Yes  -CP      Ultrasound [id number]    Location Left UT and cerical area  -CP      Rx Minutes 8  -CP      Duty Cycle 100  -CP      Frequency 1.0 MHz  -CP      Intensity - Wts/cm 1.5  -CP      ELECTRICAL STIMULATION    Attended/Unattended Unattended  -CP      Stimulation Type IFC  -CP      Location/Electrode Placement/Other cspine  -CP      Rx Minutes 15 mins  -CP        User Key  (r) = Recorded By, (t) = Taken By, (c) = Cosigned By    Initials Name Provider Type    CP Wen Moe, CAR Physical Therapy Assistant                Exercises       01/09/18 1100          Subjective Comments    Subjective Comments Patient states that he is having severe pain today.  States that the pain wakes him up at night.  to the point that he starts to cry.  Denies any pain meds.  States that his father is a cervical spine surgeon and will send over his MRI.  Patient does not drive because of the pain.  Patient just came from seeing Dr. Polo today.  Dr. Polo said to cont with therapy. Patient does not use ice or heat for pain control.  states he is doing his stretching at home  -CP      Subjective Pain    Able to rate subjective pain? yes  -CP      Pre-Treatment Pain Level 4  -CP      Post-Treatment Pain Level 4  -CP      Aquatics    Aquatics performed? No  -CP      Exercise 1    Exercise Name 1 Bilateral UT Stretch  -CP      Sets 1 3  -CP      Time (Seconds) 1 30  -CP      Exercise 2    Exercise Name 2 Bilateral LT stretch  -CP      Sets 2 3  -CP      Time (Seconds) 2 30  -CP      Exercise 3    Exercise Name 3 Doorway stretch  -CP      Sets 3 3  -CP      Time (Seconds) 3 30  -CP      Exercise 4    Exercise Name 4 Review HEP  -CP        User Key  (r) = Recorded By, (t) = Taken By, (c) = Cosigned By    Initials Name  Provider Type    CP Wen Moe PTA Physical Therapy Assistant                               PT OP Goals       01/09/18 1100       PT Short Term Goals    STG Date to Achieve 01/08/18  -CP     STG 1 Note a >/= 50% improvement  -CP     STG 1 Progress Not Met  -CP     STG 2 NDI score </= 10  -CP     STG 2 Progress Not Met  -CP     STG 3 L shoulder flexion MMT 5/5  -CP     STG 3 Progress Not Met  -CP     Long Term Goals    LTG Date to Achieve 01/29/18  -CP     LTG 1 Independent with HEP  -CP     LTG 1 Progress Progressing  -CP     LTG 2 CROM WNLs without pain  -CP     LTG 2 Progress Not Met  -CP     LTG 3 Minimal to no pain with activity  -CP     LTG 3 Progress Not Met  -CP     Time Calculation    PT Goal Re-Cert Due Date 01/08/18  -CP       User Key  (r) = Recorded By, (t) = Taken By, (c) = Cosigned By    Initials Name Provider Type    CP Wen Moe PTA Physical Therapy Assistant          Therapy Education  Education Details: doorway stretch  Given: HEP  Program: New  How Provided: Verbal, Demonstration, Written  Provided to: Patient  Level of Understanding: Verbalized, Demonstrated              Time Calculation:   Start Time: 1100  Stop Time: 1158  Time Calculation (min): 58 min  Total Timed Code Minutes- PT: 40 minute(s)    Therapy Charges for Today     Code Description Service Date Service Provider Modifiers Qty    27853504198 HC PT ULTRASOUND EA 15 MIN 1/9/2018 Wen Moe PTA GP 1    46648880430 HC PT THER PROC EA 15 MIN 1/9/2018 Wen Moe PTA GP 1    02293289976 HC PT ELECTRICAL STIM UNATTENDED 1/9/2018 Wen Moe PTA  1    19229118200 HC PT THER SUPP EA 15 MIN 1/9/2018 Wen Moe PTA GP 1                    Wen Moe PTA  1/9/2018

## 2018-01-10 LAB
TESTOST FREE SERPL-MCNC: 5 PG/ML (ref 6.8–21.5)
TESTOST SERPL-MCNC: 102 NG/DL (ref 264–916)

## 2018-01-25 ENCOUNTER — OFFICE VISIT (OUTPATIENT)
Dept: FAMILY MEDICINE CLINIC | Facility: CLINIC | Age: 43
End: 2018-01-25

## 2018-01-25 ENCOUNTER — HOSPITAL ENCOUNTER (OUTPATIENT)
Dept: PHYSICAL THERAPY | Facility: HOSPITAL | Age: 43
Setting detail: THERAPIES SERIES
Discharge: HOME | End: 2018-01-25

## 2018-01-25 ENCOUNTER — TELEPHONE (OUTPATIENT)
Dept: FAMILY MEDICINE CLINIC | Facility: CLINIC | Age: 43
End: 2018-01-25

## 2018-01-25 VITALS
BODY MASS INDEX: 28.02 KG/M2 | DIASTOLIC BLOOD PRESSURE: 80 MMHG | WEIGHT: 195.7 LBS | HEIGHT: 70 IN | SYSTOLIC BLOOD PRESSURE: 120 MMHG

## 2018-01-25 DIAGNOSIS — R20.2 PARESTHESIA OF RIGHT ARM: ICD-10-CM

## 2018-01-25 DIAGNOSIS — M54.2 NECK PAIN: Primary | ICD-10-CM

## 2018-01-25 DIAGNOSIS — E78.5 HYPERLIPIDEMIA, UNSPECIFIED HYPERLIPIDEMIA TYPE: ICD-10-CM

## 2018-01-25 DIAGNOSIS — E11.9 TYPE 2 DIABETES MELLITUS WITHOUT COMPLICATION, WITHOUT LONG-TERM CURRENT USE OF INSULIN (HCC): Primary | ICD-10-CM

## 2018-01-25 DIAGNOSIS — M54.2 NECK PAIN: ICD-10-CM

## 2018-01-25 DIAGNOSIS — E29.1 HYPOTESTOSTERONEMIA IN MALE: ICD-10-CM

## 2018-01-25 DIAGNOSIS — Z72.0 TOBACCO USE: ICD-10-CM

## 2018-01-25 PROCEDURE — 99214 OFFICE O/P EST MOD 30 MIN: CPT | Performed by: FAMILY MEDICINE

## 2018-01-25 PROCEDURE — 97012 MECHANICAL TRACTION THERAPY: CPT | Performed by: PHYSICAL THERAPIST

## 2018-01-25 PROCEDURE — 97164 PT RE-EVAL EST PLAN CARE: CPT | Performed by: PHYSICAL THERAPIST

## 2018-01-25 RX ORDER — ATORVASTATIN CALCIUM 20 MG/1
20 TABLET, FILM COATED ORAL DAILY
Qty: 90 TABLET | Refills: 3 | Status: SHIPPED | OUTPATIENT
Start: 2018-01-25

## 2018-01-25 RX ORDER — TESTOSTERONE CYPIONATE 200 MG/ML
200 INJECTION, SOLUTION INTRAMUSCULAR
Qty: 10 ML | Refills: 0 | Status: SHIPPED | OUTPATIENT
Start: 2018-01-25 | End: 2018-05-02 | Stop reason: SDUPTHER

## 2018-01-25 RX ORDER — TESTOSTERONE CYPIONATE 200 MG/ML
200 INJECTION, SOLUTION INTRAMUSCULAR
COMMUNITY
End: 2018-01-25 | Stop reason: SDUPTHER

## 2018-01-25 RX ORDER — CYCLOBENZAPRINE HCL 5 MG
5 TABLET ORAL 3 TIMES DAILY PRN
Qty: 60 TABLET | Refills: 2 | Status: SHIPPED | OUTPATIENT
Start: 2018-01-25

## 2018-01-25 RX ORDER — MONTELUKAST SODIUM 10 MG/1
10 TABLET ORAL NIGHTLY
Qty: 90 TABLET | Refills: 3 | Status: SHIPPED | OUTPATIENT
Start: 2018-01-25

## 2018-01-25 RX ORDER — POTASSIUM CHLORIDE 600 MG/1
8 TABLET, FILM COATED, EXTENDED RELEASE ORAL DAILY
Qty: 90 TABLET | Refills: 3 | Status: SHIPPED | OUTPATIENT
Start: 2018-01-25

## 2018-01-25 RX ORDER — CHOLECALCIFEROL (VITAMIN D3) 125 MCG
10 CAPSULE ORAL NIGHTLY
Qty: 180 TABLET | Refills: 3 | Status: SHIPPED | OUTPATIENT
Start: 2018-01-25

## 2018-01-25 RX ORDER — ALBUTEROL SULFATE 90 UG/1
2 AEROSOL, METERED RESPIRATORY (INHALATION) EVERY 4 HOURS PRN
Qty: 3 INHALER | Refills: 4 | Status: SHIPPED | OUTPATIENT
Start: 2018-01-25

## 2018-01-25 RX ORDER — BUDESONIDE AND FORMOTEROL FUMARATE DIHYDRATE 80; 4.5 UG/1; UG/1
2 AEROSOL RESPIRATORY (INHALATION)
Qty: 3 INHALER | Refills: 3 | Status: SHIPPED | OUTPATIENT
Start: 2018-01-25

## 2018-01-25 RX ORDER — METFORMIN HYDROCHLORIDE 500 MG/1
500 TABLET, EXTENDED RELEASE ORAL
Qty: 90 TABLET | Refills: 3 | Status: SHIPPED | OUTPATIENT
Start: 2018-01-25

## 2018-01-25 NOTE — PATIENT INSTRUCTIONS
Steps to Quit Smoking  Smoking tobacco can be harmful to your health and can affect almost every organ in your body. Smoking puts you, and those around you, at risk for developing many serious chronic diseases. Quitting smoking is difficult, but it is one of the best things that you can do for your health. It is never too late to quit.  What are the benefits of quitting smoking?  When you quit smoking, you lower your risk of developing serious diseases and conditions, such as:  · Lung cancer or lung disease, such as COPD.  · Heart disease.  · Stroke.  · Heart attack.  · Infertility.  · Osteoporosis and bone fractures.  Additionally, symptoms such as coughing, wheezing, and shortness of breath may get better when you quit. You may also find that you get sick less often because your body is stronger at fighting off colds and infections. If you are pregnant, quitting smoking can help to reduce your chances of having a baby of low birth weight.  How do I get ready to quit?  When you decide to quit smoking, create a plan to make sure that you are successful. Before you quit:  · Pick a date to quit. Set a date within the next two weeks to give you time to prepare.  · Write down the reasons why you are quitting. Keep this list in places where you will see it often, such as on your bathroom mirror or in your car or wallet.  · Identify the people, places, things, and activities that make you want to smoke (triggers) and avoid them. Make sure to take these actions:  ¨ Throw away all cigarettes at home, at work, and in your car.  ¨ Throw away smoking accessories, such as ashtrays and lighters.  ¨ Clean your car and make sure to empty the ashtray.  ¨ Clean your home, including curtains and carpets.  · Tell your family, friends, and coworkers that you are quitting. Support from your loved ones can make quitting easier.  · Talk with your health care provider about your options for quitting smoking.  · Find out what treatment  options are covered by your health insurance.  What strategies can I use to quit smoking?  Talk with your healthcare provider about different strategies to quit smoking. Some strategies include:  · Quitting smoking altogether instead of gradually lessening how much you smoke over a period of time. Research shows that quitting “cold turkey” is more successful than gradually quitting.  · Attending in-person counseling to help you build problem-solving skills. You are more likely to have success in quitting if you attend several counseling sessions. Even short sessions of 10 minutes can be effective.  · Finding resources and support systems that can help you to quit smoking and remain smoke-free after you quit. These resources are most helpful when you use them often. They can include:  ¨ Online chats with a counselor.  ¨ Telephone quitlines.  ¨ Printed self-help materials.  ¨ Support groups or group counseling.  ¨ Text messaging programs.  ¨ Mobile phone applications.  · Taking medicines to help you quit smoking. (If you are pregnant or breastfeeding, talk with your health care provider first.) Some medicines contain nicotine and some do not. Both types of medicines help with cravings, but the medicines that include nicotine help to relieve withdrawal symptoms. Your health care provider may recommend:  ¨ Nicotine patches, gum, or lozenges.  ¨ Nicotine inhalers or sprays.  ¨ Non-nicotine medicine that is taken by mouth.  Talk with your health care provider about combining strategies, such as taking medicines while you are also receiving in-person counseling. Using these two strategies together makes you more likely to succeed in quitting than if you used either strategy on its own.  If you are pregnant or breastfeeding, talk with your health care provider about finding counseling or other support strategies to quit smoking. Do not take medicine to help you quit smoking unless told to do so by your health care  provider.  What things can I do to make it easier to quit?  Quitting smoking might feel overwhelming at first, but there is a lot that you can do to make it easier. Take these important actions:  · Reach out to your family and friends and ask that they support and encourage you during this time. Call telephone quitlines, reach out to support groups, or work with a counselor for support.  · Ask people who smoke to avoid smoking around you.  · Avoid places that trigger you to smoke, such as bars, parties, or smoke-break areas at work.  · Spend time around people who do not smoke.  · Lessen stress in your life, because stress can be a smoking trigger for some people. To lessen stress, try:  ¨ Exercising regularly.  ¨ Deep-breathing exercises.  ¨ Yoga.  ¨ Meditating.  ¨ Performing a body scan. This involves closing your eyes, scanning your body from head to toe, and noticing which parts of your body are particularly tense. Purposefully relax the muscles in those areas.  · Download or purchase mobile phone or tablet apps (applications) that can help you stick to your quit plan by providing reminders, tips, and encouragement. There are many free apps, such as QuitGuide from the CDC (Centers for Disease Control and Prevention). You can find other support for quitting smoking (smoking cessation) through smokefree.gov and other websites.  How will I feel when I quit smoking?  Within the first 24 hours of quitting smoking, you may start to feel some withdrawal symptoms. These symptoms are usually most noticeable 2-3 days after quitting, but they usually do not last beyond 2-3 weeks. Changes or symptoms that you might experience include:  · Mood swings.  · Restlessness, anxiety, or irritation.  · Difficulty concentrating.  · Dizziness.  · Strong cravings for sugary foods in addition to nicotine.  · Mild weight gain.  · Constipation.  · Nausea.  · Coughing or a sore throat.  · Changes in how your medicines work in your  body.  · A depressed mood.  · Difficulty sleeping (insomnia).  After the first 2-3 weeks of quitting, you may start to notice more positive results, such as:  · Improved sense of smell and taste.  · Decreased coughing and sore throat.  · Slower heart rate.  · Lower blood pressure.  · Clearer skin.  · The ability to breathe more easily.  · Fewer sick days.  Quitting smoking is very challenging for most people. Do not get discouraged if you are not successful the first time. Some people need to make many attempts to quit before they achieve long-term success. Do your best to stick to your quit plan, and talk with your health care provider if you have any questions or concerns.  This information is not intended to replace advice given to you by your health care provider. Make sure you discuss any questions you have with your health care provider.  Document Released: 12/12/2002 Document Revised: 08/15/2017 Document Reviewed: 05/03/2016  [employer] Interactive Patient Education © 2017 [employer]

## 2018-01-25 NOTE — PROGRESS NOTES
Subjective   Ankush Miller is a 42 y.o. male.     History of Present Illness   follow-up after lab work.  Also listed found to have a fax number were medicines can be faxed.   need to mail his gabapentin and testosterone.  Lab work revealed blood sugar elevated cholesterol elevated and testosterone level very low.  Have  need for lifestyle changes.  Has been on medicines in the past.  Needs restart below medications.  Have  mainly on lifestyle changes today.  Majority of time today spent administrative straightening out of the situations and issues.    The following portions of the patient's history were reviewed and updated as appropriate: allergies, current medications, past family history, past medical history, past social history, past surgical history and problem list.    Review of Systems   Constitutional: Negative for activity change, appetite change, fatigue and unexpected weight change.   HENT: Negative for trouble swallowing and voice change.    Eyes: Negative for redness and visual disturbance.   Respiratory: Negative for cough and wheezing.    Cardiovascular: Negative for chest pain and palpitations.   Gastrointestinal: Negative for abdominal pain, constipation, diarrhea, nausea and vomiting.   Genitourinary: Negative for urgency.   Musculoskeletal: Positive for neck pain (Neck pain down through for continuing physical therapy.). Negative for joint swelling.   Neurological: Negative for syncope and headaches.   Hematological: Negative for adenopathy.   Psychiatric/Behavioral: Negative for sleep disturbance. The patient is nervous/anxious.        Objective   Physical Exam   Constitutional: He is oriented to person, place, and time. He appears well-developed.   HENT:   Head: Normocephalic.   Eyes: Pupils are equal, round, and reactive to light.   Neck: Normal range of motion.   Cardiovascular: Normal rate.    Pulmonary/Chest: Effort normal.   Abdominal: Soft.   Musculoskeletal: Normal  range of motion. He exhibits tenderness (Neck chronic).   Neurological: He is alert and oriented to person, place, and time. He has normal reflexes.   Psychiatric: His speech is normal. His affect is labile. He is agitated. Cognition and memory are normal.       Assessment/Plan   Problems Addressed this Visit        Cardiovascular and Mediastinum    Hyperlipidemia (Chronic)    Relevant Medications    atorvastatin (LIPITOR) 20 MG tablet    Other Relevant Orders    Lipid Panel With LDL / HDL Ratio    Hepatic Function Panel       Endocrine    Hypotestosteronemia in male (Chronic)    Relevant Medications    Testosterone Cypionate (DEPOTESTOTERONE CYPIONATE) 200 MG/ML injection    Type 2 diabetes mellitus without complication, without long-term current use of insulin - Primary (Chronic)    Relevant Medications    metFORMIN ER (GLUCOPHAGE-XR) 500 MG 24 hr tablet       Other    Tobacco use (Chronic)      Other Visit Diagnoses     Neck pain        Relevant Medications    cyclobenzaprine (FLEXERIL) 5 MG tablet    Paresthesia of right arm        Relevant Medications    cyclobenzaprine (FLEXERIL) 5 MG tablet       counseled deferring until psychiatric concerns his psychiatric provider.  Medicines have been refilled.  We'll start giving testosterone 200 mg every week clicking give to self.  Recheck lab work on starting a cholesterol drug in 4 weeks.  Recheck on sugar in 4 months.  30-35 minutes spent on case.   stopping on smoking is in the past

## 2018-01-25 NOTE — THERAPY PROGRESS REPORT/RE-CERT
Outpatient Physical Therapy Ortho Progress Note  [facility]     Patient Name: Ankush Miller  : 1975  MRN: [id number]  Today's Date: 2018      Visit Date: 2018  Attendance: 4/ (12 from 2017 to 2018)  Subjective Improvement: 8-10%  Next MD Appt: 18  Recert Date: 2/15/18    Therapy Diagnosis: Cervical dysfunction    Changes in Medications: Percogesic added to medication list  Changes in MD Orders: none noted  Number of Work Days Lost: none       Past Medical History:   Diagnosis Date   • Allergic rhinitis    • Anal pain     Anal pain - anal fissure      • Constipation    • Dyspnea    • Kidney stone    • Migraine    • Non-IgE mediated allergic asthma     IgE-mediated allergic asthma      • PTSD (post-traumatic stress disorder)    • Tobacco dependence syndrome         Past Surgical History:   Procedure Laterality Date   • ANAL FISSURECTOMY/FISTULECTOMY  2013    Anal fissurectomy with VY-angioplasty and lateral internal sphincterotomy.    • CHOLECYSTECTOMY     • ENDOSCOPY AND COLONOSCOPY  2013     1 polyp in sigmoid colon; removed by snare catuery polypectomy. Internal & external hemorrhoids found.    • INJECTION OF MEDICATION  2013    Depo Medrol (Methylprednisone) (7)         • INJECTION OF MEDICATION  2013    Phenergan (1)            Visit Dx:     ICD-10-CM ICD-9-CM   1. Neck pain M54.2 723.1                 PT Ortho       18 0900    Subjective Comments    Subjective Comments Patient reports an increase in nerve pain after his last therapy session. IFC estim was uncomfortable during the treatment. Pain started later that evening. He is questioning if the symptoms can be related to psoriatic arthritis. Pain is entire L cervical spine, skips UT region, down lateral L brachium, skips proximal forearm, the picks up again distal forearm to ring and small fingers. Left ring and small fingers feel tingly and numb. Stretches have helped some. 8-10%  subjective improvement. Has started Percogesic (OTC pill) for his pain.   -SS    Precautions and Contraindications    Precautions/Limitations no known precautions/limitations  -SS    Precautions none  -SS    Contraindications none  -SS    Subjective Pain    Able to rate subjective pain? yes  -SS    Pre-Treatment Pain Level 4  -SS    Post-Treatment Pain Level 4  -SS    Posture/Observations    Forward Head Mild  -SS    Cervical Lordosis Decreased  -SS    Thoracic Kyphosis Normal  -SS    Rounded Shoulders Left:  -SS    Scapular Elevation Left:  -SS    Lumbar lordosis Normal  -SS    DTR- Upper Quarter Clearing    Biceps (C5/6) Right:;1- Minimal response;Left:;0- No response  -SS    Brachioradialis (C6) Bilateral:;1- Minimal response  -SS    Triceps (C7) Bilateral:;0- No response  -SS    Sensory Screen for Light Touch- Upper Quarter Clearing    C4 (posterior shoulder) Bilateral:;Intact  -SS    C5 (lateral upper arm) Bilateral:;Intact  -SS    C6 (tip of thumb) Bilateral:;Intact  -SS    C7 (tip of 3rd finger) Bilateral:;Intact  -SS    C8 (tip of 5th finger) Right:;Intact;Left:;Diminished  -SS    T1 (medial lower arm) Right:;Intact   dysaesthesia left  -SS    Myotomal Screen- Upper Quarter Clearing    Shoulder flexion (C5) Bilateral:;5 (Normal)  -SS    Elbow flexion/wrist extension (C6) Bilateral:;5 (Normal)  -SS    Elbow extension/wrist flexion (C7) Bilateral:;5 (Normal)  -SS    Finger flexion/ (C8) Bilateral:;5 (Normal)  -SS    Finger abduction (T1) Bilateral:;5 (Normal)  -SS     --   R 105#, L 120#  -SS    Cervical Palpation    Occiput --   non-tender  -SS    Suboccipital --   non-tender  -SS    SCM --   non-tender  -SS    Cervical Facets Left:;Tender   alignment WFLs  -SS    Spinous Process Left:;Tender   alignment WFLs  -SS    Levator Scapula Left:;Tender   no spasm/tautness noted  -SS    Upper Traps Left:;Tender   no spasm/tautness noted  -SS    Cervical/Thoracic Special Tests    Cervical Compression  "(Foraminal Compression vs. Facet Pain) Positive  -SS    Cervical Distraction (Foraminal Compression vs. Facet Pain) Negative  -SS    Upper Level Neural Tension Tests    Median Neural Tension Test Left:;Negative  -SS    Ulnar Neural Tension Test Left:;Negative  -SS    Neck    Flexion AROM Deficit 42; \"that makes things tingle and hurt\"  -SS    Extension AROM Deficit 40  -SS    Lt Lat Flexion AROM Deficit 40  -SS    Rt Lateral Flexion AROM Deficit 20; pain and \"aggravates my fingers\"  -SS    Lt Rotation AROM Deficit 54  -SS    Rt Rotation AROM Deficit 47; pain neck and superior shoulder  -SS      User Key  (r) = Recorded By, (t) = Taken By, (c) = Cosigned By    Initials Name Provider Type     Hermilo Johns, PT DPT Physical Therapist                      Therapy Education  Given: Other (comment) (therapy POC)  How Provided: Verbal  Provided to: Patient  Level of Understanding: Verbalized           PT OP Goals       01/25/18 0900       PT Short Term Goals    STG Date to Achieve 01/08/18   further STGs deferred  -SS     STG 1 Note a >/= 50% improvement  -SS     STG 1 Progress Not Met  -SS     STG 2 NDI score </= 10  -SS     STG 2 Progress Not Met  -SS     STG 3 L shoulder flexion MMT 5/5  -SS     STG 3 Progress Met  -SS     Long Term Goals    LTG Date to Achieve 02/15/18  -SS     LTG 1 Independent with HEP  -SS     LTG 1 Progress Progressing  -SS     LTG 2 CROM WNLs without pain  -     LTG 2 Progress Not Met  -SS     LTG 3 Minimal to no pain with activity  -     LTG 3 Progress Not Met  -SS     Time Calculation    PT Goal Re-Cert Due Date 02/15/18  -       User Key  (r) = Recorded By, (t) = Taken By, (c) = Cosigned By    Initials Name Provider Type     Hermilo Johns, PT DPT Physical Therapist                PT Assessment/Plan       01/25/18 0900       PT Assessment    Functional Limitations Limitations in functional capacity and performance  -SS     Impairments Range of motion;Pain;Joint mobility "  -SS     Assessment Comments Continued pain and symptoms. Question worsening of radiculopathy. Patient felt better after mechanical cervical traction. Rehabilitation Progress Note sent with patient for his appointment with Dr. Pool today.  -     Rehab Potential Good  -SS     Patient/caregiver participated in establishment of treatment plan and goals Yes  -SS     Patient would benefit from skilled therapy intervention Yes  -SS     PT Plan    PT Frequency 2x/week  -SS     Predicted Duration of Therapy Intervention (days/wks) 2-3 weeks  -SS     PT Plan Comments Mechanical cervical traction, cervical stabilization, IFC estim with heat/ice as needed for pain.  -SS       User Key  (r) = Recorded By, (t) = Taken By, (c) = Cosigned By    Initials Name Provider Type    SS Hermilo Johns, PT DPT Physical Therapist                Modalities       01/25/18 0900          Traction 33743    Traction Type Cervical  -SS      Rx Minutes 10  -SS      Duration Intermittent  -SS      Position Supine  -SS      Weight --   18# hold, 9# rest  -SS      Hold 60  -SS      Relax 15  -SS      Progression 2  -SS      Regression 2  -SS        User Key  (r) = Recorded By, (t) = Taken By, (c) = Cosigned By    Initials Name Provider Type    SS Hermilo Johns, PT DPT Physical Therapist                          Outcome Measure Options: Neck Disability Index (NDI)  Neck Disability Index  Section 1 - Pain Intensity: The pain is moderate at the moment.  Section 2 - Personal Care: I can look after myself normally, but it causes extra pain.  Section 3 - Lifting: I can lift heavy weights, but it gives me extra pain.  Section 4 - Work: I can only do my usual work, but no more  Section 5 - Headaches: I have moderate headaches that come infrequently.  Section 6 - Concentration: I can concentrate fully with slight difficulty.  Section 7 - Sleeping: My sleep is moderately disturbed for up to 2-3 hours.  Section 8 - Driving: I can't drive as long  as I want because of moderate neck pain.  Section 10 - Recreation: I have neck pain with most recreational activities.  Neck Disability Index Score: 17      Time Calculation:   Start Time: 0940  Stop Time: 1025  Time Calculation (min): 45 min     Therapy Charges for Today     Code Description Service Date Service Provider Modifiers Qty    22114315638 HC PT TRACTION CERVICAL 1/25/2018 Hermilo Johns, PT DPT GP 1    61405418129 HC PT RE-EVAL ESTABLISHED PLAN 2 1/25/2018 Hermilo Johns, PT DPT GP 1                   Hermilo Johns, PT, DPT, CHT  1/25/2018

## 2018-01-29 ENCOUNTER — HOSPITAL ENCOUNTER (OUTPATIENT)
Dept: PHYSICAL THERAPY | Facility: HOSPITAL | Age: 43
Setting detail: THERAPIES SERIES
Discharge: HOME | End: 2018-01-29

## 2018-01-29 DIAGNOSIS — M54.2 NECK PAIN: Primary | ICD-10-CM

## 2018-01-29 PROCEDURE — 97110 THERAPEUTIC EXERCISES: CPT

## 2018-01-29 PROCEDURE — G0283 ELEC STIM OTHER THAN WOUND: HCPCS

## 2018-01-29 PROCEDURE — 97012 MECHANICAL TRACTION THERAPY: CPT

## 2018-01-29 NOTE — THERAPY TREATMENT NOTE
Outpatient Physical Therapy Ortho Treatment Note  [facility]     Patient Name: Ankush Miller  : 1975  MRN: [id number]  Today's Date: 2018      Visit Date: 2018     Subjective Improvement: 8-10%  Attendance:  5/6 (Eval + 12 until 18)  Next MD Visit : 18  Recert Date:  02/15/18      Therapy Diagnosis:  Cervical Dysfunction        Visit Dx:    ICD-10-CM ICD-9-CM   1. Neck pain M54.2 723.1       Patient Active Problem List   Diagnosis   • Tobacco use   • Mild intermittent asthma   • Psychiatric disorder   • History of hypotestosteronemia   • Neck pain, chronic   • Hypotestosteronemia in male   • Hyperlipidemia   • Type 2 diabetes mellitus without complication, without long-term current use of insulin        Past Medical History:   Diagnosis Date   • Allergic rhinitis    • Anal pain     Anal pain - anal fissure      • Constipation    • Dyspnea    • Kidney stone    • Migraine    • Non-IgE mediated allergic asthma     IgE-mediated allergic asthma      • PTSD (post-traumatic stress disorder)    • Tobacco dependence syndrome         Past Surgical History:   Procedure Laterality Date   • ANAL FISSURECTOMY/FISTULECTOMY  2013    Anal fissurectomy with VY-angioplasty and lateral internal sphincterotomy.    • CHOLECYSTECTOMY     • ENDOSCOPY AND COLONOSCOPY  2013     1 polyp in sigmoid colon; removed by snare catuery polypectomy. Internal & external hemorrhoids found.    • INJECTION OF MEDICATION  2013    Depo Medrol (Methylprednisone) (7)         • INJECTION OF MEDICATION  2013    Phenergan (1)                  PT Ortho       18 0840    Precautions and Contraindications    Precautions/Limitations no known precautions/limitations  -KH    Precautions none  -KH    Contraindications none  -KH    Posture/Observations    Posture/Observations Comments Fwd Head Posture  -KH      User Key  (r) = Recorded By, (t) = Taken By, (c) = Cosigned By    Initials Name Provider  Type    ANTHONY Ybarra PTA Physical Therapy Assistant                            PT Assessment/Plan       01/29/18 0840       PT Assessment    Assessment Comments Pain in the left shoulder with estim; educated pt on benefits of estim and that the intensity doesn't need to so intense that it grabs the muscles. good tolerance to Cerival traction;  Tband scap stab aggrevated Left anterior shoulder with burning and tingling, but not down the arm.   -     PT Plan    PT Frequency 2x/week  -ANTHONY     Predicted Duration of Therapy Intervention (days/wks) 2-3 weeks  -     PT Plan Comments Pt reports that he won't be back in town from work until 3 weeks. 9 more visits approved until 02/27/18; Continue with traction for cervical spine as benefical.. Increase scap stab and postural strengthening as able.   -       User Key  (r) = Recorded By, (t) = Taken By, (c) = Cosigned By    Initials Name Provider Type    ANTHONY Ybarra PTA Physical Therapy Assistant                Modalities       01/29/18 0840          Subjective Pain    Post-Treatment Pain Level 6  -KH      Moist Heat    MH Applied Yes  -KH      Location C-spine  -KH      Rx Minutes 15 mins  -KH      MH S/P Rx Yes  -KH      ELECTRICAL STIMULATION    Attended/Unattended Unattended  -      Stimulation Type IFC  -      Location/Electrode Placement/Other cspine  -KH      Rx Minutes 15 mins  -KH      Traction 30627    Traction Type Cervical  -KH      Rx Minutes 15  -KH      Duration Intermittent  -KH      Position Supine  -KH      Weight --   20# hold, 10# rest  -KH      Hold 60  -KH      Relax 10  -KH      Progression 1  -KH      Regression 1  -KH        User Key  (r) = Recorded By, (t) = Taken By, (c) = Cosigned By    Initials Name Provider Type    ANTHONY Ybarra PTA Physical Therapy Assistant                Exercises       01/29/18 0840          Subjective Comments    Subjective Comments Pt reports that the traction really helped.  States that he had relief until he  "did something stupid and started reading with his head facing straight down.   -KH      Subjective Pain    Able to rate subjective pain? yes  -KH      Pre-Treatment Pain Level 6  -KH      Post-Treatment Pain Level 6  -KH      Exercise 1    Exercise Name 1 Cervical Traction  -KH      Time (Minutes) 1 15'  -KH      Additional Comments see modalities  -KH      Exercise 2    Exercise Name 2 Tband Mid/High Rows  -KH      Sets 2 3  -KH      Reps 2 10  -KH      Additional Comments Green-Added to HEP  -KH      Exercise 3    Exercise Name 3 Tband Lat Pulls  -KH      Sets 3 2  -KH      Reps 3 10  -KH      Additional Comments Green-Added to HEP  -KH      Exercise 4    Exercise Name 4 Doorway stretch  -KH      Sets 4 3  -KH      Time (Seconds) 4 30\"  -KH      Exercise 5    Exercise Name 5 B Upper Trap Stretch  -KH      Sets 5 3  -KH      Time (Seconds) 5 30\"  -KH      Exercise 6    Exercise Name 6 B Levator Scap Stretch  -KH      Sets 6 3  -KH      Time (Seconds) 6 30\"  -KH        User Key  (r) = Recorded By, (t) = Taken By, (c) = Cosigned By    Initials Name Provider Type    ANTHONY Ybarra PTA Physical Therapy Assistant                               PT OP Goals       01/29/18 0840       PT Short Term Goals    STG Date to Achieve --   further STGs deferred  -KH     Long Term Goals    LTG Date to Achieve 02/15/18  -KH     LTG 1 Independent with HEP  -KH     LTG 1 Progress Progressing  -KH     LTG 2 CROM WNLs without pain  -KH     LTG 2 Progress Not Met  -KH     LTG 3 Minimal to no pain with activity  -KH     LTG 3 Progress Not Met  -KH     Time Calculation    PT Goal Re-Cert Due Date 02/15/18  -       User Key  (r) = Recorded By, (t) = Taken By, (c) = Cosigned By    Initials Name Provider Type    ANTHONY Ybarra PTA Physical Therapy Assistant                         Time Calculation:   Start Time: 0840  Stop Time: 0942  Time Calculation (min): 62 min  Total Timed Code Minutes- PT: 32 minute(s)    Therapy Charges for Today     " Code Description Service Date Service Provider Modifiers Qty    68465393501 HC PT THER SUPP EA 15 MIN 1/29/2018 Nancy Ybarra, PTA GP 1    15734308398 HC PT ELECTRICAL STIM UNATTENDED 1/29/2018 Nancy Ybarra, PTA  1    95402800152 HC PT TRACTION CERVICAL 1/29/2018 Nancy Ybarra, PTA GP 1    75023521108 HC PT THER PROC EA 15 MIN 1/29/2018 Nancy Ybarra PTA GP 2                    Nancy Ybarra PTA  1/29/2018

## 2018-01-31 ENCOUNTER — APPOINTMENT (OUTPATIENT)
Dept: PHYSICAL THERAPY | Facility: HOSPITAL | Age: 43
End: 2018-01-31

## 2018-02-20 DIAGNOSIS — M54.2 NECK PAIN, CHRONIC: ICD-10-CM

## 2018-02-20 DIAGNOSIS — G89.29 NECK PAIN, CHRONIC: ICD-10-CM

## 2018-02-20 RX ORDER — GABAPENTIN 300 MG/1
900 CAPSULE ORAL 3 TIMES DAILY
Qty: 270 CAPSULE | Refills: 2 | Status: SHIPPED | OUTPATIENT
Start: 2018-02-20

## 2018-02-26 ENCOUNTER — HOSPITAL ENCOUNTER (OUTPATIENT)
Dept: PHYSICAL THERAPY | Facility: HOSPITAL | Age: 43
Setting detail: THERAPIES SERIES
Discharge: HOME | End: 2018-02-26

## 2018-02-26 DIAGNOSIS — M54.2 NECK PAIN: Primary | ICD-10-CM

## 2018-02-26 PROCEDURE — 97140 MANUAL THERAPY 1/> REGIONS: CPT | Performed by: PHYSICAL THERAPIST

## 2018-02-27 NOTE — THERAPY DISCHARGE NOTE
Outpatient Physical Therapy Ortho Progress Note/Discharge Summary  [facility]     Patient Name: Ankush Miller  : 1975  MRN: [id number]  Today's Date: 2018      Visit Date: 2018  Attendance: 67   Subjective Improvement: minimal  Next MD Appt: 18    Therapy Diagnosis: Cervical dysfunction      Changes in Medications: none noted  Changes in MD Orders: none noted  Number of Work Days Lost: none      Visit Dx:    ICD-10-CM ICD-9-CM   1. Neck pain M54.2 723.1            Past Medical History:   Diagnosis Date   • Allergic rhinitis    • Anal pain     Anal pain - anal fissure      • Constipation    • Dyspnea    • Kidney stone    • Migraine    • Non-IgE mediated allergic asthma     IgE-mediated allergic asthma      • PTSD (post-traumatic stress disorder)    • Tobacco dependence syndrome         Past Surgical History:   Procedure Laterality Date   • ANAL FISSURECTOMY/FISTULECTOMY  2013    Anal fissurectomy with VY-angioplasty and lateral internal sphincterotomy.    • CHOLECYSTECTOMY     • ENDOSCOPY AND COLONOSCOPY  2013     1 polyp in sigmoid colon; removed by snare catuery polypectomy. Internal & external hemorrhoids found.    • INJECTION OF MEDICATION  2013    Depo Medrol (Methylprednisone) (7)         • INJECTION OF MEDICATION  2013    Phenergan (1)                  PT Ortho       18 0800    Subjective Comments    Subjective Comments Continued pain. Only relief comes from keeping his left arm behind his back. He has not been able to attend therapy since 18 due to personal and financial reasons. He reports that he has been continuing his medications and his HEP. He has appointment with rheumatologist in a few months to see if it is related to psoriatic arthritis. He has been prescribed cyclobenzaprine which helps with the muscle spasms that helps some. Pain is in the left lateral cervical spine across the upper trap region. Symptoms decrease in mid-brachium  and mid-antebrachium and is prevalent in ulnar distribution of the hand. The hand feels numb and prickly. Shoulder is painful like something is injured. Manual cervical traction helped some but the mechanical cervical traction did not help as well.  -SS    Precautions and Contraindications    Precautions/Limitations no known precautions/limitations  -SS    Precautions none  -SS    Contraindications none  -SS    Subjective Pain    Able to rate subjective pain? yes  -SS    Pre-Treatment Pain Level 6  -SS    Post-Treatment Pain Level 4  -SS    Posture/Observations    Forward Head Mild  -SS    Cervical Lordosis Decreased  -SS    Thoracic Kyphosis Normal  -SS    Rounded Shoulders Bilateral:  -SS    Scapular Elevation Left:  -SS    Lumbar lordosis Normal  -SS    Posture/Observations Comments Sits keeping his cervical spine flexed and left hand behind his back.   -SS    Cervical/Thoracic Special Tests    Spurlings (Foraminal Compression) Left:;Positive  -SS    Cervical Compression (Foraminal Compression vs. Facet Pain) Left:;Positive  -SS    Cervical Distraction (Foraminal Compression vs. Facet Pain) Left:;Positive  -SS    Upper Level Neural Tension Tests    Median Neural Tension Test Left:;Negative  -SS    Ulnar Neural Tension Test Left:;Positive   slight  -SS    Neck    Flexion AR OM Deficit 40; increased radicular pain  -SS    Extension AR OM Deficit 40; cervical pain  -SS    Lt Lat Flexion AR OM Deficit 34; radicular pain; substitutes into flexion  -SS    Rt Lateral Flexion AR OM Deficit 40; substitutes into flexion  -SS    Lt Rotation AR OM Deficit 57; cervical and radicular pain  -SS    Rt Rotation AR OM Deficit 67; cervical and radicular pain  -SS      User Key  (r) = Recorded By, (t) = Taken By, (c) = Cosigned By    Initials Name Provider Type    SS Hermilo Johns, PT DPT Physical Therapist                            PT Assessment/Plan       02/26/18 0800       PT Assessment    Functional Limitations  Limitations in functional capacity and performance  -SS     Impairments Range of motion;Pain;Joint mobility  -     Assessment Comments Poor compliance with attendance at clinic, but patient is able to demonstrate his HEP. He does not seem to be a progressing with attempts at conservative care.  -     Rehab Potential Fair   barrier: compliance, not progressing  -     Patient/caregiver participated in establishment of treatment plan and goals Yes  -SS     Patient would benefit from skilled therapy intervention No  -SS     PT Plan    PT Frequency Other (comment)   D/C  -SS     PT Plan Comments D/C P.T. at this time.   -SS       User Key  (r) = Recorded By, (t) = Taken By, (c) = Cosigned By    Initials Name Provider Type     Hermilo Johns, PT DPT Physical Therapist                                Manual Rx (last 36 hours)      Manual Treatments       02/26/18 0800          Manual Rx 1    Manual Rx 1 Location cervical spine  -SS      Manual Rx 1 Type manual distraction  -SS      Manual Rx 1 Duration 8  -SS        User Key  (r) = Recorded By, (t) = Taken By, (c) = Cosigned By    Initials Name Provider Type     Hermilo Johns, PT DPT Physical Therapist                PT OP Goals       02/26/18 0800       PT Short Term Goals    STG Date to Achieve --   further STGs deferred  -     Long Term Goals    LTG Date to Achieve 02/15/18  -     LTG 1 Independent with HEP  -     LTG 1 Progress Met  -     LTG 2 CROM WNLs without pain  -     LTG 2 Progress Not Met  -SS     LTG 3 Minimal to no pain with activity  -     LTG 3 Progress Not Met  -       User Key  (r) = Recorded By, (t) = Taken By, (c) = Cosigned By    Initials Name Provider Type     Hermilo Johns, PT DPT Physical Therapist          Therapy Education  Given: HEP (light manual cervical distraction)  Program: Modified  How Provided: Demonstration, Verbal  Provided to: Other (comment) (patient's fiancee)  Level of Understanding:  Verbalized    Outcome Measure Options: Neck Disability Index (NDI)  Neck Disability Index  Section 1 - Pain Intensity: The pain is fairly severe at the moment.  Section 2 - Personal Care: I can look after myself normally, but it causes extra pain.  Section 3 - Lifting: Pain prevents me from lifting heavy weights, but I can manage light weights if they are conveniently positioned.  Section 4 - Work: I can do most of my usual work, but no more  Section 5 - Headaches: I have moderate headaches that come frequently  Section 6 - Concentration: I can concentrate fully with slight difficulty.  Section 7 - Sleeping: My sleep is moderately disturbed for up to 2-3 hours.  Section 8 - Driving: I can't drive as long as I want because of moderate neck pain.  Section 9 - Reading: I can't read as much as I want because of moderate neck pain.  Section 10 - Recreation: I have neck pain with most recreational activities.  Neck Disability Index Score: 25      Time Calculation:   Start Time: 0848  Stop Time: 0920  Time Calculation (min): 32 min    Therapy Charges for Today     Code Description Service Date Service Provider Modifiers Qty    [id number] HC PT MANUAL THERAPY EA 15 MIN 2/26/2018 Hermilo Johns, PT DPT GP 1    [id number] HC PT THER SUPP EA 15 MIN 2/26/2018 Hermilo Johns, PT DPT GP 1               OP PT Discharge Summary  Date of Discharge: 02/26/18  Reason for Discharge: Lack of progress  Outcomes Achieved: Patient able to partially achieve established goals  Discharge Destination: Home with home program (recommend MD follow up)      Hermilo Johns, PT, DPT, CHT  2/26/2018

## 2018-02-28 ENCOUNTER — APPOINTMENT (OUTPATIENT)
Dept: PHYSICAL THERAPY | Facility: HOSPITAL | Age: 43
End: 2018-02-28

## 2018-03-20 ENCOUNTER — TELEPHONE (OUTPATIENT)
Dept: FAMILY MEDICINE CLINIC | Facility: CLINIC | Age: 43
End: 2018-03-20

## 2018-03-24 ENCOUNTER — LAB (OUTPATIENT)
Dept: LAB | Facility: HOSPITAL | Age: 43
End: 2018-03-24

## 2018-03-24 DIAGNOSIS — E78.5 HYPERLIPIDEMIA, UNSPECIFIED HYPERLIPIDEMIA TYPE: ICD-10-CM

## 2018-03-24 DIAGNOSIS — E29.1 HYPOTESTOSTERONEMIA IN MALE: ICD-10-CM

## 2018-03-24 LAB
ALBUMIN SERPL-MCNC: 4.5 G/DL (ref 3.4–4.8)
ALP SERPL-CCNC: 70 U/L (ref 38–126)
ALT SERPL W P-5'-P-CCNC: 62 U/L (ref 21–72)
AST SERPL-CCNC: 44 U/L (ref 17–59)
BILIRUB CONJ SERPL-MCNC: 0 MG/DL (ref 0–0.3)
BILIRUB INDIRECT SERPL-MCNC: 0.1 MG/DL (ref 0–1.1)
BILIRUB SERPL-MCNC: 0.4 MG/DL (ref 0.2–1.3)
CHOLEST SERPL-MCNC: 88 MG/DL (ref 0–199)
HDLC SERPL-MCNC: 44 MG/DL (ref 60–200)
LDLC SERPL CALC-MCNC: 30 MG/DL (ref 0–129)
LDLC/HDLC SERPL: 0.68 {RATIO} (ref 0–3.55)
PROT SERPL-MCNC: 7.6 G/DL (ref 6.3–8.6)
TRIGL SERPL-MCNC: 71 MG/DL (ref 20–199)
VLDLC SERPL-MCNC: 14.2 MG/DL

## 2018-03-24 PROCEDURE — 80061 LIPID PANEL: CPT

## 2018-03-24 PROCEDURE — 36415 COLL VENOUS BLD VENIPUNCTURE: CPT

## 2018-03-24 PROCEDURE — 84402 ASSAY OF FREE TESTOSTERONE: CPT

## 2018-03-24 PROCEDURE — 84403 ASSAY OF TOTAL TESTOSTERONE: CPT

## 2018-03-24 PROCEDURE — 80076 HEPATIC FUNCTION PANEL: CPT

## 2018-03-27 LAB
TESTOST FREE SERPL-MCNC: 28.5 PG/ML (ref 6.8–21.5)
TESTOST SERPL-MCNC: 1006 NG/DL (ref 264–916)

## 2018-03-28 ENCOUNTER — TELEPHONE (OUTPATIENT)
Dept: FAMILY MEDICINE CLINIC | Facility: CLINIC | Age: 43
End: 2018-03-28

## 2018-04-25 ENCOUNTER — TELEPHONE (OUTPATIENT)
Dept: FAMILY MEDICINE CLINIC | Facility: CLINIC | Age: 43
End: 2018-04-25

## 2018-05-02 ENCOUNTER — TELEPHONE (OUTPATIENT)
Dept: FAMILY MEDICINE CLINIC | Facility: CLINIC | Age: 43
End: 2018-05-02

## 2018-05-02 DIAGNOSIS — E29.1 HYPOTESTOSTERONEMIA IN MALE: ICD-10-CM

## 2018-05-02 RX ORDER — TESTOSTERONE CYPIONATE 200 MG/ML
200 INJECTION, SOLUTION INTRAMUSCULAR
Qty: 10 ML | Refills: 0 | Status: SHIPPED | OUTPATIENT
Start: 2018-05-02

## 2018-05-24 ENCOUNTER — TELEPHONE (OUTPATIENT)
Dept: FAMILY MEDICINE CLINIC | Facility: CLINIC | Age: 43
End: 2018-05-24

## 2018-07-03 ENCOUNTER — HOSPITAL ENCOUNTER (OUTPATIENT)
Dept: MRI IMAGING | Facility: HOSPITAL | Age: 43
Discharge: HOME | End: 2018-07-03
Admitting: INTERNAL MEDICINE

## 2018-07-03 ENCOUNTER — HOSPITAL ENCOUNTER (OUTPATIENT)
Dept: MRI IMAGING | Facility: HOSPITAL | Age: 43
Discharge: HOME | End: 2018-07-03

## 2018-07-03 DIAGNOSIS — M25.50 ARTHRALGIA, UNSPECIFIED JOINT: ICD-10-CM

## 2018-07-03 DIAGNOSIS — M54.2 NECK PAIN: ICD-10-CM

## 2018-07-03 PROCEDURE — A9576 INJ PROHANCE MULTIPACK: HCPCS | Performed by: INTERNAL MEDICINE

## 2018-07-03 PROCEDURE — 72141 MRI NECK SPINE W/O DYE: CPT

## 2018-07-03 PROCEDURE — 25010000002 GADOTERIDOL PER 1 ML: Performed by: INTERNAL MEDICINE

## 2018-07-03 PROCEDURE — 72197 MRI PELVIS W/O & W/DYE: CPT

## 2018-07-03 RX ADMIN — GADOTERIDOL 18 ML: 279.3 INJECTION, SOLUTION INTRAVENOUS at 12:45

## 2019-04-10 ENCOUNTER — HOSPITAL ENCOUNTER (OUTPATIENT)
Dept: OTHER | Facility: HOSPITAL | Age: 44
Discharge: HOME | End: 2019-04-10
Attending: INTERNAL MEDICINE

## 2019-04-10 LAB
25(OH)D3 SERPL-MCNC: 17.5 NG/ML (ref 30–100)
ALBUMIN SERPL-MCNC: 4.5 G/DL (ref 3.5–5)
ALBUMIN/GLOB SERPL: 1.7 {RATIO} (ref 1.4–2.6)
ALP SERPL-CCNC: 71 U/L (ref 53–128)
ALT SERPL-CCNC: 42 U/L (ref 10–40)
ANION GAP SERPL CALC-SCNC: 20 MMOL/L (ref 8–19)
AST SERPL-CCNC: 28 U/L (ref 15–50)
BASOPHILS # BLD AUTO: 0.05 10*3/UL (ref 0–0.2)
BASOPHILS NFR BLD AUTO: 0.5 % (ref 0–3)
BILIRUB SERPL-MCNC: <0.15 MG/DL (ref 0.2–1.3)
BUN SERPL-MCNC: 15 MG/DL (ref 5–25)
BUN/CREAT SERPL: 13 {RATIO} (ref 6–20)
CALCIUM SERPL-MCNC: 9.1 MG/DL (ref 8.7–10.4)
CHLORIDE SERPL-SCNC: 98 MMOL/L (ref 99–111)
CONV ABS IMM GRAN: 0.04 10*3/UL (ref 0–0.2)
CONV CO2: 25 MMOL/L (ref 22–32)
CONV IMMATURE GRAN: 0.4 % (ref 0–1.8)
CONV TOTAL PROTEIN: 7.2 G/DL (ref 6.3–8.2)
CREAT UR-MCNC: 1.13 MG/DL (ref 0.7–1.2)
DEPRECATED RDW RBC AUTO: 43.5 FL (ref 35.1–43.9)
EOSINOPHIL # BLD AUTO: 0.32 10*3/UL (ref 0–0.7)
EOSINOPHIL # BLD AUTO: 3.5 % (ref 0–7)
ERYTHROCYTE [DISTWIDTH] IN BLOOD BY AUTOMATED COUNT: 13 % (ref 11.6–14.4)
ERYTHROCYTE [SEDIMENTATION RATE] IN BLOOD: 3 MM/H (ref 0–20)
GFR SERPLBLD BASED ON 1.73 SQ M-ARVRAT: >60 ML/MIN/{1.73_M2}
GLOBULIN UR ELPH-MCNC: 2.7 G/DL (ref 2–3.5)
GLUCOSE SERPL-MCNC: 191 MG/DL (ref 70–99)
HBA1C MFR BLD: 13.4 G/DL (ref 14–18)
HCT VFR BLD AUTO: 40.2 % (ref 42–52)
LYMPHOCYTES # BLD AUTO: 3.89 10*3/UL (ref 1–5)
MCH RBC QN AUTO: 30.8 PG (ref 27–31)
MCHC RBC AUTO-ENTMCNC: 33.3 G/DL (ref 33–37)
MCV RBC AUTO: 92.4 FL (ref 80–96)
MONOCYTES # BLD AUTO: 0.62 10*3/UL (ref 0.2–1.2)
MONOCYTES NFR BLD AUTO: 6.7 % (ref 3–10)
NEUTROPHILS # BLD AUTO: 4.35 10*3/UL (ref 2–8)
NEUTROPHILS NFR BLD AUTO: 46.9 % (ref 30–85)
NRBC CBCN: 0 % (ref 0–0.7)
OSMOLALITY SERPL CALC.SUM OF ELEC: 294 MOSM/KG (ref 273–304)
PLATELET # BLD AUTO: 339 10*3/UL (ref 130–400)
PMV BLD AUTO: 9.8 FL (ref 9.4–12.4)
POTASSIUM SERPL-SCNC: 4.2 MMOL/L (ref 3.5–5.3)
RBC # BLD AUTO: 4.35 10*6/UL (ref 4.7–6.1)
SODIUM SERPL-SCNC: 139 MMOL/L (ref 135–147)
VARIANT LYMPHS NFR BLD MANUAL: 42 % (ref 20–45)
WBC # BLD AUTO: 9.27 10*3/UL (ref 4.8–10.8)

## 2019-06-10 ENCOUNTER — HOSPITAL ENCOUNTER (OUTPATIENT)
Dept: LAB | Facility: HOSPITAL | Age: 44
Discharge: HOME | End: 2019-06-10
Attending: INTERNAL MEDICINE

## 2019-06-10 LAB
ALT SERPL-CCNC: 23 U/L (ref 10–40)
ASO AB SERPL-ACNC: 63 [IU]/ML (ref 0–200)
BASOPHILS # BLD AUTO: 0.04 10*3/UL (ref 0–0.2)
BASOPHILS NFR BLD AUTO: 0.4 % (ref 0–3)
BUN SERPL-MCNC: 17 MG/DL (ref 5–25)
CONV ABS IMM GRAN: 0.04 10*3/UL (ref 0–0.2)
CONV IMMATURE GRAN: 0.4 % (ref 0–1.8)
CREAT UR-MCNC: 1.06 MG/DL (ref 0.7–1.2)
DEPRECATED RDW RBC AUTO: 45.1 FL (ref 35.1–43.9)
EOSINOPHIL # BLD AUTO: 0.29 10*3/UL (ref 0–0.7)
EOSINOPHIL # BLD AUTO: 2.6 % (ref 0–7)
ERYTHROCYTE [DISTWIDTH] IN BLOOD BY AUTOMATED COUNT: 13 % (ref 11.6–14.4)
ERYTHROCYTE [SEDIMENTATION RATE] IN BLOOD: 8 MM/H (ref 0–20)
HBA1C MFR BLD: 12.7 G/DL (ref 14–18)
HCT VFR BLD AUTO: 39.3 % (ref 42–52)
LYMPHOCYTES # BLD AUTO: 3.51 10*3/UL (ref 1–5)
MCH RBC QN AUTO: 30.7 PG (ref 27–31)
MCHC RBC AUTO-ENTMCNC: 32.3 G/DL (ref 33–37)
MCV RBC AUTO: 94.9 FL (ref 80–96)
MONOCYTES # BLD AUTO: 0.7 10*3/UL (ref 0.2–1.2)
MONOCYTES NFR BLD AUTO: 6.3 % (ref 3–10)
NEUTROPHILS # BLD AUTO: 6.57 10*3/UL (ref 2–8)
NEUTROPHILS NFR BLD AUTO: 58.8 % (ref 30–85)
NRBC CBCN: 0 % (ref 0–0.7)
PLATELET # BLD AUTO: 280 10*3/UL (ref 130–400)
PMV BLD AUTO: 9.7 FL (ref 9.4–12.4)
RBC # BLD AUTO: 4.14 10*6/UL (ref 4.7–6.1)
VARIANT LYMPHS NFR BLD MANUAL: 31.5 % (ref 20–45)
WBC # BLD AUTO: 11.15 10*3/UL (ref 4.8–10.8)

## 2019-07-25 ENCOUNTER — HOSPITAL ENCOUNTER (OUTPATIENT)
Dept: OTHER | Facility: HOSPITAL | Age: 44
Discharge: HOME | End: 2019-07-25
Attending: INTERNAL MEDICINE

## 2019-07-28 LAB
CONV QUANTIFERON TB GOLD: NEGATIVE
QUANTIFERON CRITERIA: NORMAL
QUANTIFERON MITOGEN VALUE: >10 IU/ML
QUANTIFERON NIL VALUE: 0.05 IU/ML
QUANTIFERON TB1 AG VALUE: 0.1 IU/ML
QUANTIFERON TB2 AG VALUE: 0.05 IU/ML

## 2020-05-13 ENCOUNTER — HOSPITAL ENCOUNTER (OUTPATIENT)
Dept: LAB | Facility: HOSPITAL | Age: 45
Discharge: HOME | End: 2020-05-13
Attending: INTERNAL MEDICINE

## 2020-05-13 LAB
25(OH)D3 SERPL-MCNC: 11.1 NG/ML (ref 30–100)
ALBUMIN SERPL-MCNC: 4.3 G/DL (ref 3.5–5)
ALT SERPL-CCNC: 24 U/L (ref 10–40)
AST SERPL-CCNC: 16 U/L (ref 15–50)
BASOPHILS # BLD AUTO: 0.07 10*3/UL (ref 0–0.2)
BASOPHILS NFR BLD AUTO: 0.8 % (ref 0–3)
BUN SERPL-MCNC: 14 MG/DL (ref 5–25)
CONV ABS IMM GRAN: 0.02 10*3/UL (ref 0–0.2)
CONV IMMATURE GRAN: 0.2 % (ref 0–1.8)
CREAT UR-MCNC: 1.13 MG/DL (ref 0.7–1.2)
DEPRECATED RDW RBC AUTO: 43.5 FL (ref 35.1–43.9)
EOSINOPHIL # BLD AUTO: 0.21 10*3/UL (ref 0–0.7)
EOSINOPHIL # BLD AUTO: 2.3 % (ref 0–7)
ERYTHROCYTE [DISTWIDTH] IN BLOOD BY AUTOMATED COUNT: 12.8 % (ref 11.6–14.4)
ERYTHROCYTE [SEDIMENTATION RATE] IN BLOOD: 2 MM/H (ref 0–20)
HCT VFR BLD AUTO: 42.5 % (ref 42–52)
HGB BLD-MCNC: 13.9 G/DL (ref 14–18)
LYMPHOCYTES # BLD AUTO: 4.3 10*3/UL (ref 1–5)
LYMPHOCYTES NFR BLD AUTO: 46.2 % (ref 20–45)
MCH RBC QN AUTO: 30.3 PG (ref 27–31)
MCHC RBC AUTO-ENTMCNC: 32.7 G/DL (ref 33–37)
MCV RBC AUTO: 92.8 FL (ref 80–96)
MONOCYTES # BLD AUTO: 0.8 10*3/UL (ref 0.2–1.2)
MONOCYTES NFR BLD AUTO: 8.6 % (ref 3–10)
NEUTROPHILS # BLD AUTO: 3.9 10*3/UL (ref 2–8)
NEUTROPHILS NFR BLD AUTO: 41.9 % (ref 30–85)
NRBC CBCN: 0 % (ref 0–0.7)
PLATELET # BLD AUTO: 402 10*3/UL (ref 130–400)
PMV BLD AUTO: 9.6 FL (ref 9.4–12.4)
RBC # BLD AUTO: 4.58 10*6/UL (ref 4.7–6.1)
WBC # BLD AUTO: 9.3 10*3/UL (ref 4.8–10.8)